# Patient Record
Sex: MALE | Race: WHITE | NOT HISPANIC OR LATINO | Employment: FULL TIME | URBAN - NONMETROPOLITAN AREA
[De-identification: names, ages, dates, MRNs, and addresses within clinical notes are randomized per-mention and may not be internally consistent; named-entity substitution may affect disease eponyms.]

---

## 2017-01-09 ENCOUNTER — OFFICE VISIT (OUTPATIENT)
Dept: ORTHOPEDIC SURGERY | Facility: CLINIC | Age: 47
End: 2017-01-09

## 2017-01-09 VITALS — BODY MASS INDEX: 36.44 KG/M2 | WEIGHT: 269 LBS | HEIGHT: 72 IN

## 2017-01-09 DIAGNOSIS — M25.531 RIGHT WRIST PAIN: ICD-10-CM

## 2017-01-09 DIAGNOSIS — R20.0 NUMBNESS AND TINGLING IN RIGHT HAND: ICD-10-CM

## 2017-01-09 DIAGNOSIS — G56.01 CARPAL TUNNEL SYNDROME ON RIGHT: Primary | ICD-10-CM

## 2017-01-09 DIAGNOSIS — R20.2 NUMBNESS AND TINGLING IN RIGHT HAND: ICD-10-CM

## 2017-01-09 PROCEDURE — 99213 OFFICE O/P EST LOW 20 MIN: CPT | Performed by: ORTHOPAEDIC SURGERY

## 2017-01-09 NOTE — PROGRESS NOTES
Venkatesh Grimes is a 46 y.o. male saw initially saw this individual he only did studies on the right side is injury was hyperextension of the ulnar aspect of his hand but he complains of carpal tunnel syndrome and he has a positive EMG but we never studied the opposite hand also was never discussed he'll want his activity would be at work when we do the surgery so he is going to do that additionally  Primary provider:  No Known Provider       Chief Complaint   Patient presents with   • Right Wrist - Follow-up, Carpal Tunnel       HISTORY OF PRESENT ILLNESS:patient was seen by PASHA Johnston on 12/16/2016 referred to Dr. Lópze for carpal tunnel syndrome, work comp has approved for carpal tunnel release.     HPI Comments: According to the patient this numbness/tingling started after an injury while at work.     Carpal Tunnel   Associated symptoms include chest pain, fatigue, neck pain, numbness and weakness.   Upper Extremity Issue   This is a new problem. The current episode started more than 1 month ago. The problem occurs daily. The problem has been gradually worsening. Associated symptoms include chest pain, fatigue, neck pain, numbness and weakness. Exacerbated by: tasks involving fine motor movements.  He has tried acetaminophen and NSAIDs for the symptoms.        CONCURRENT MEDICAL HISTORY:    Past Medical History   Diagnosis Date   • Chest pain    • CTS (carpal tunnel syndrome)    • Hyperlipidemia        Allergies   Allergen Reactions   • Iodine        No current outpatient prescriptions on file.    Past Surgical History   Procedure Laterality Date   • Rotator cuff repair         Family History   Problem Relation Age of Onset   • Diabetes Mother    • Heart disease Mother    • Hypertension Mother    • Hypertension Father    • Diabetes Father    • Hypertension Sister    • Diabetes Sister    • Heart disease Brother    • Hypertension Brother         Social History     Social History   • Marital status:   "    Spouse name: N/A   • Number of children: N/A   • Years of education: N/A     Occupational History   • Not on file.     Social History Main Topics   • Smoking status: Current Every Day Smoker     Packs/day: 1.00     Types: Cigarettes   • Smokeless tobacco: Current User     Types: Chew   • Alcohol use No   • Drug use: No   • Sexual activity: Not on file     Other Topics Concern   • Not on file     Social History Narrative        Review of Systems   Constitutional: Positive for fatigue.   Cardiovascular: Positive for chest pain.   Musculoskeletal: Positive for neck pain.   Neurological: Positive for weakness and numbness.       PHYSICAL EXAMINATION:       Visit Vitals   • Ht 72\" (182.9 cm)   • Wt 269 lb (122 kg)   • BMI 36.48 kg/m2       Physical Exam   Constitutional: He is oriented to person, place, and time. Vital signs are normal. He appears well-developed and well-nourished. He is cooperative.   HENT:   Head: Normocephalic and atraumatic.   Pulmonary/Chest: Effort normal. No respiratory distress.   Abdominal: Soft. He exhibits no distension.   Neurological: He is alert and oriented to person, place, and time. GCS eye subscore is 4. GCS verbal subscore is 5. GCS motor subscore is 6.   Skin: Skin is warm, dry and intact.   Psychiatric: He has a normal mood and affect. His speech is normal and behavior is normal. Judgment and thought content normal. Cognition and memory are normal.   Nursing note and vitals reviewed.      GAIT:     [x]  Normal  []  Antalgic    Assistive device: [x]  None  []  Walker     []  Crutches  []  Cane     []  Wheelchair  []  Stretcher    Right Hand Exam     Tenderness   The patient is experiencing no tenderness.         Range of Motion     Wrist   Extension: normal   Flexion: normal   Pronation: normal   Supination: normal     Muscle Strength   : 4/5     Tests   Tinel’s Sign (Medial Nerve): positive    Other   Erythema: absent  Scars: absent  Sensation: normal  Pulse: " present      Left Hand Exam   Left hand exam is normal.              EMG- 11/28/16  Assessment: Abnormal test. EMG-NCS test is most consistent with right upper extremity moderate-stage carpal tunnel syndrome with involvement of right median motor and sensory fibers at the level of the wrist. No obvious neurophysiologic evidence of right upper extremity ulnar neurophathy.       ASSESSMENT:    Diagnoses and all orders for this visit:    Carpal tunnel syndrome on right    Numbness and tingling in right hand    Right wrist pain          PLAN plan is carpal tunnel surgery there is no evidence of ulnar nerve neuropathy testing this in the office at the hospital patient's choice.    He is going to go to his work activity to see what light duty they have available for him after surgery and EMG of the opposite hand and sure he doesn't have bilaterally. 01/09/17 at 10:09 AM by MD Babak Guo MD

## 2017-01-09 NOTE — MR AVS SNAPSHOT
"                        Venkatesh Grimes   1/9/2017 8:00 AM   Office Visit    Dept Phone:  [phone]   Encounter #:  [id number]    Provider:  Babak López MD   Department:  [facility] ORTHOPEDICS                Your Full Care Plan              Your Updated Medication List      Notice  As of 1/9/2017  8:45 AM    You have not been prescribed any medications.            You Were Diagnosed With        Codes Comments    Carpal tunnel syndrome on right    -  Primary ICD-10-CM: G56.01  ICD-9-CM: 354.0     Numbness and tingling in right hand     ICD-10-CM: R20.2  ICD-9-CM: 782.0     Right wrist pain     ICD-10-CM: M25.531  ICD-9-CM: 719.43       Instructions     None    Patient Instructions History      Upcoming Appointments     Visit Type Date Time Department    FOLLOW UP 1/9/2017  8:00 AM [facility]    OFFICE VISIT 6/20/2017  4:15 PM [facility] Signup     Our records indicate that you have declined [facility] sign[facility]. If you would like to sign up for [facility], please email [email or web address] or call [phone] to obtain an activation code.             Other Info from Your Visit           Your Appointments     Jun 20, 2017  4:15 PM CDT   Office Visit with River Lee MD   [facility] CARDIOLOGY (--)    [address] [address] [address]  [facility] [id number] [phone]           Arrive 15 minutes prior to appointment.              Allergies     Iodine        Reason for Visit     Right Wrist - Follow-up, Carpal Tunnel           Vital Signs     Height Weight Body Mass Index Smoking Status          72\" (182.9 cm) 269 lb (122 kg) 36.48 kg/m2 Current Every Day Smoker        Problems and Diagnoses Noted     Carpal tunnel syndrome on right    Numbness and tingling in right hand    Right wrist pain        "

## 2017-03-15 ENCOUNTER — OFFICE VISIT (OUTPATIENT)
Dept: ORTHOPEDIC SURGERY | Facility: CLINIC | Age: 47
End: 2017-03-15

## 2017-03-15 VITALS — WEIGHT: 270 LBS | HEIGHT: 72 IN | BODY MASS INDEX: 36.57 KG/M2

## 2017-03-15 DIAGNOSIS — R20.0 NUMBNESS AND TINGLING IN RIGHT HAND: ICD-10-CM

## 2017-03-15 DIAGNOSIS — M25.531 RIGHT WRIST PAIN: ICD-10-CM

## 2017-03-15 DIAGNOSIS — G56.01 CARPAL TUNNEL SYNDROME ON RIGHT: Primary | ICD-10-CM

## 2017-03-15 DIAGNOSIS — R20.2 NUMBNESS AND TINGLING IN RIGHT HAND: ICD-10-CM

## 2017-03-15 PROCEDURE — 99213 OFFICE O/P EST LOW 20 MIN: CPT | Performed by: ORTHOPAEDIC SURGERY

## 2017-03-15 NOTE — PROGRESS NOTES
"  Venkatesh Grimes is a 46 y.o. male returns for follow-up carpal tunnels EMG results he has bilateral carpal tunnel syndromes left right he was going ahead and proceed with the right side since she states his work-related massive specifically if they excepted this is work-related he says he had ice ask if he's had his paperwork spelled out yes he was going and proceed with carpal tunnel release she says she's been approved we can't do that today check and plan do that next Monday change from previous examination     Chief Complaint   Patient presents with   • Right Wrist - Carpal Tunnel       HISTORY OF PRESENT ILLNESS: patient had emg done on left side by Dr. Croft 2/16/2017 (left side not work related).        CONCURRENT MEDICAL HISTORY:    Past Medical History   Diagnosis Date   • Chest pain    • CTS (carpal tunnel syndrome)    • Hyperlipidemia        Allergies   Allergen Reactions   • Iodine          Current Outpatient Prescriptions:   •  oseltamivir (TAMIFLU) 75 MG capsule, Take 1 capsule by mouth 2 (Two) Times a Day., Disp: 10 capsule, Rfl: 0    Past Surgical History   Procedure Laterality Date   • Rotator cuff repair         ROS  No fevers or chills.  No chest pain or shortness of air.  No GI or  disturbances.    PHYSICAL EXAMINATION:       Visit Vitals   • Ht 72\" (182.9 cm)   • Wt 270 lb (122 kg)   • BMI 36.62 kg/m2       Physical Exam    GAIT:     [x]  Normal  []  Antalgic    Assistive device: [x]  None  []  Walker     []  Crutches  []  Cane     []  Wheelchair  []  Stretcher    Ortho Exam no change from previous examination positive Tinel's at the wrist he states his main complaint is pain on extension of his ring and small finger they did not do studies on the ulnar nerve disease he says his whole hand goes numb I'm not sure were going to get testing for the ulnar nerve so and there is no clinical evidence of neuropathy for the ulnar nerve we just released carpal tunnel see how he does be scheduled " for next Monday  No results found.          ASSESSMENT:    Diagnoses and all orders for this visit:    Carpal tunnel syndrome on right    Numbness and tingling in right hand    Right wrist pain          PLAN    Return in about 7 days (around 3/22/2017).    Babak López MD

## 2017-03-20 ENCOUNTER — PROCEDURE VISIT (OUTPATIENT)
Dept: ORTHOPEDIC SURGERY | Facility: CLINIC | Age: 47
End: 2017-03-20

## 2017-03-20 VITALS — HEIGHT: 72 IN | BODY MASS INDEX: 36.57 KG/M2 | WEIGHT: 270 LBS

## 2017-03-20 DIAGNOSIS — R20.2 NUMBNESS AND TINGLING IN RIGHT HAND: ICD-10-CM

## 2017-03-20 DIAGNOSIS — M25.531 RIGHT WRIST PAIN: ICD-10-CM

## 2017-03-20 DIAGNOSIS — G56.01 CARPAL TUNNEL SYNDROME ON RIGHT: Primary | ICD-10-CM

## 2017-03-20 DIAGNOSIS — R20.0 NUMBNESS AND TINGLING IN RIGHT HAND: ICD-10-CM

## 2017-03-20 PROCEDURE — 64721 CARPAL TUNNEL SURGERY: CPT | Performed by: ORTHOPAEDIC SURGERY

## 2017-03-20 RX ORDER — HYDROCODONE BITARTRATE AND ACETAMINOPHEN 5; 325 MG/1; MG/1
1 TABLET ORAL EVERY 6 HOURS PRN
Qty: 30 TABLET | Refills: 0 | Status: SHIPPED | OUTPATIENT
Start: 2017-03-20 | End: 2017-04-26

## 2017-03-20 NOTE — PROGRESS NOTES
"Venkatesh Grimes is a 46 y.o. male returns for     Chief Complaint   Patient presents with   • Right Wrist - Follow-up   • Procedure     right side carpal tunnel release       HISTORY OF PRESENT ILLNESS:       CONCURRENT MEDICAL HISTORY:    Past Medical History   Diagnosis Date   • Chest pain    • CTS (carpal tunnel syndrome)    • Hyperlipidemia        Allergies   Allergen Reactions   • Iodine          Current Outpatient Prescriptions:   •  HYDROcodone-acetaminophen (NORCO) 5-325 MG per tablet, Take 1 tablet by mouth Every 6 (Six) Hours As Needed for Mild Pain (1-3)., Disp: 30 tablet, Rfl: 0  •  oseltamivir (TAMIFLU) 75 MG capsule, Take 1 capsule by mouth 2 (Two) Times a Day., Disp: 10 capsule, Rfl: 0    Past Surgical History   Procedure Laterality Date   • Rotator cuff repair         ROS  No fevers or chills.  No chest pain or shortness of air.  No GI or  disturbances.    PHYSICAL EXAMINATION:       Visit Vitals   • Ht 72\" (182.9 cm)   • Wt 270 lb (122 kg)   • BMI 36.62 kg/m2       Physical Exam    GAIT:     []  Normal  []  Antalgic    Assistive device: []  None  []  Walker     []  Crutches  []  Cane     []  Wheelchair  []  Stretcher    Ortho Exam    The patient voiced understanding of the risks, benefits, and alternative forms of treatment that were discussed and the patient consents to proceed with surgery.  All risks, benefits and alternatives were discussed.  Risks including to but not exclusive to anesthetic complications, including death, MI, CVA, infection, bleeding DVT, fracture, residual pain and need for future surgery.  Description of procedure after satisfactory application the tourniquet on the upper arm and prepping and draping of the arm.  Local for transition the area of the carpal tunnel was done with 10 cc 2% Carbocaine.  Incision was made in the distal flexor crease curvilinear into the wrist crease skin subjacent tissue was divided palmaris longus tendon was identified and protected " transverse carpal ligament and distal forearm fascia was identified.  Subcutaneous tissue was dissected off the transverse carpal ligament in the palm.  An incision was made in the transverse carpal ligament and hemostat was placed underneath the ligament  any adhesions of the median nerve from the transverse carpal ligament and the transverse carpal ligament was divided in its entirety was palpated to ensure that it was completely released sensation was made in the distal forearm fascia was scissors to release this area there is no bleeding Penrose drain was placed in depths of the wound and the wound was closed with running 4-0 nylon skin suture sterile dressing volar plaster splint and Ace wrap patient was instructed in wound care and activities remove dressing in 24 hours removal of the drain follow-up in the office and in 7-10 days for suture removal.            ASSESSMENT:    Diagnoses and all orders for this visit:    Carpal tunnel syndrome on right    Right wrist pain    Numbness and tingling in right hand    Other orders  -     HYDROcodone-acetaminophen (NORCO) 5-325 MG per tablet; Take 1 tablet by mouth Every 6 (Six) Hours As Needed for Mild Pain (1-3).          PLAN    Return in about 10 days (around 3/30/2017) for suture removal. .    Babak López MD

## 2017-03-29 ENCOUNTER — OFFICE VISIT (OUTPATIENT)
Dept: ORTHOPEDIC SURGERY | Facility: CLINIC | Age: 47
End: 2017-03-29

## 2017-03-29 VITALS — HEIGHT: 72 IN | WEIGHT: 270 LBS | BODY MASS INDEX: 36.57 KG/M2

## 2017-03-29 DIAGNOSIS — G56.01 CARPAL TUNNEL SYNDROME ON RIGHT: Primary | ICD-10-CM

## 2017-03-29 DIAGNOSIS — R20.2 NUMBNESS AND TINGLING IN RIGHT HAND: ICD-10-CM

## 2017-03-29 DIAGNOSIS — R20.0 NUMBNESS AND TINGLING IN RIGHT HAND: ICD-10-CM

## 2017-03-29 DIAGNOSIS — M25.531 RIGHT WRIST PAIN: ICD-10-CM

## 2017-03-29 PROCEDURE — 99024 POSTOP FOLLOW-UP VISIT: CPT | Performed by: ORTHOPAEDIC SURGERY

## 2017-03-29 NOTE — PROGRESS NOTES
"Postop Follow-up    Name:  Venkatesh Grimes  Date:  3/29/2017  :  1970    Chief Complaint:    Chief Complaint   Patient presents with   • Post-op     right site carpal tunnel release   • Wound Check   • Suture / Staple Removal     Date of surgery: 3/20/17    History of Present Illness: patient is having some pain in the right palm        Current Outpatient Prescriptions:   •  HYDROcodone-acetaminophen (NORCO) 5-325 MG per tablet, Take 1 tablet by mouth Every 6 (Six) Hours As Needed for Mild Pain (1-3)., Disp: 30 tablet, Rfl: 0  •  oseltamivir (TAMIFLU) 75 MG capsule, Take 1 capsule by mouth 2 (Two) Times a Day., Disp: 10 capsule, Rfl: 0    Allergies   Allergen Reactions   • Iodine          Exam:  Vitals:    17 0919   Weight: 270 lb (122 kg)   Height: 72\" (182.9 cm)       The patient is awake, alert, and oriented and in no apparent distress.    Right upper extremity: Wounds healed no infection full range of motion of the hand and fingers    Left upper extremity:    Right lower extremity:    Left lower extremity:      No results found.      Assessment:  Diagnoses and all orders for this visit:    Carpal tunnel syndrome on right    Right wrist pain    Numbness and tingling in right hand          Plan: Return to work modified duty for 1 month recheck in 1 month        No Follow-up on file.      17 at 9:20 AM by Babak López MD  "

## 2017-04-26 ENCOUNTER — OFFICE VISIT (OUTPATIENT)
Dept: ORTHOPEDIC SURGERY | Facility: CLINIC | Age: 47
End: 2017-04-26

## 2017-04-26 VITALS — HEIGHT: 72 IN | BODY MASS INDEX: 36.3 KG/M2 | WEIGHT: 268 LBS

## 2017-04-26 DIAGNOSIS — G56.01 CARPAL TUNNEL SYNDROME ON RIGHT: Primary | ICD-10-CM

## 2017-04-26 DIAGNOSIS — R20.2 NUMBNESS AND TINGLING IN RIGHT HAND: ICD-10-CM

## 2017-04-26 DIAGNOSIS — M25.531 RIGHT WRIST PAIN: ICD-10-CM

## 2017-04-26 DIAGNOSIS — R20.0 NUMBNESS AND TINGLING IN RIGHT HAND: ICD-10-CM

## 2017-04-26 PROCEDURE — 99024 POSTOP FOLLOW-UP VISIT: CPT | Performed by: ORTHOPAEDIC SURGERY

## 2017-04-26 NOTE — PROGRESS NOTES
"Venkatesh Grimes is a 46 y.o. male returns for     Chief Complaint   Patient presents with   • Right Hand - Follow-up     Follow up post Carpal tunnel release, 3/20/17       HISTORY OF PRESENT ILLNESS:  Soreness but overall doing well.       CONCURRENT MEDICAL HISTORY:    Past Medical History:   Diagnosis Date   • Chest pain    • CTS (carpal tunnel syndrome)    • Hyperlipidemia        Allergies   Allergen Reactions   • Iodine        No current outpatient prescriptions on file.    Past Surgical History:   Procedure Laterality Date   • ROTATOR CUFF REPAIR         ROS  No fevers or chills.  No chest pain or shortness of air.  No GI or  disturbances.    PHYSICAL EXAMINATION:       Ht 72\" (182.9 cm)  Wt 268 lb (122 kg)  BMI 36.35 kg/m2    Physical Exam    GAIT:     [x]  Normal  []  Antalgic    Assistive device: [x]  None  []  Walker     []  Crutches  []  Cane     []  Wheelchair  []  Stretcher    Ortho Exam wounds healed no infection still some tenderness fall function he's return to full work activity no residuals      No results found.          ASSESSMENT:    Diagnoses and all orders for this visit:    Carpal tunnel syndrome on right    Right wrist pain    Numbness and tingling in right hand          PLAN    No Follow-up on file.    Babak López MD  "

## 2017-08-01 ENCOUNTER — APPOINTMENT (OUTPATIENT)
Dept: LAB | Facility: HOSPITAL | Age: 47
End: 2017-08-01

## 2017-08-01 ENCOUNTER — OFFICE VISIT (OUTPATIENT)
Dept: FAMILY MEDICINE CLINIC | Facility: CLINIC | Age: 47
End: 2017-08-01

## 2017-08-01 VITALS
BODY MASS INDEX: 36.08 KG/M2 | SYSTOLIC BLOOD PRESSURE: 128 MMHG | WEIGHT: 266.4 LBS | HEIGHT: 72 IN | DIASTOLIC BLOOD PRESSURE: 88 MMHG

## 2017-08-01 DIAGNOSIS — R13.12 OROPHARYNGEAL DYSPHAGIA: Primary | ICD-10-CM

## 2017-08-01 DIAGNOSIS — Z13.220 SCREENING CHOLESTEROL LEVEL: ICD-10-CM

## 2017-08-01 DIAGNOSIS — Z72.0 TOBACCO USE: ICD-10-CM

## 2017-08-01 DIAGNOSIS — R53.83 FATIGUE, UNSPECIFIED TYPE: ICD-10-CM

## 2017-08-01 PROBLEM — M25.531 RIGHT WRIST PAIN: Status: RESOLVED | Noted: 2017-01-09 | Resolved: 2017-08-01

## 2017-08-01 PROBLEM — G56.01 CARPAL TUNNEL SYNDROME ON RIGHT: Chronic | Status: ACTIVE | Noted: 2017-01-09

## 2017-08-01 PROBLEM — R20.0 NUMBNESS AND TINGLING IN RIGHT HAND: Status: RESOLVED | Noted: 2017-01-09 | Resolved: 2017-08-01

## 2017-08-01 PROBLEM — R20.2 NUMBNESS AND TINGLING IN RIGHT HAND: Status: RESOLVED | Noted: 2017-01-09 | Resolved: 2017-08-01

## 2017-08-01 LAB
ALBUMIN SERPL-MCNC: 5.1 G/DL (ref 3.4–4.8)
ALBUMIN/GLOB SERPL: 1.8 G/DL (ref 1.1–1.8)
ALP SERPL-CCNC: 77 U/L (ref 38–126)
ALT SERPL W P-5'-P-CCNC: 44 U/L (ref 21–72)
ANION GAP SERPL CALCULATED.3IONS-SCNC: 12 MMOL/L (ref 5–15)
AST SERPL-CCNC: 56 U/L (ref 17–59)
BILIRUB SERPL-MCNC: 0.6 MG/DL (ref 0.2–1.3)
BUN BLD-MCNC: 17 MG/DL (ref 7–21)
BUN/CREAT SERPL: 17.2 (ref 7–25)
CALCIUM SPEC-SCNC: 10.1 MG/DL (ref 8.4–10.2)
CHLORIDE SERPL-SCNC: 100 MMOL/L (ref 95–110)
CHOLEST SERPL-MCNC: 227 MG/DL (ref 0–199)
CO2 SERPL-SCNC: 26 MMOL/L (ref 22–31)
CREAT BLD-MCNC: 0.99 MG/DL (ref 0.7–1.3)
DEPRECATED RDW RBC AUTO: 40 FL (ref 35.1–43.9)
ERYTHROCYTE [DISTWIDTH] IN BLOOD BY AUTOMATED COUNT: 12.4 % (ref 11.5–14.5)
GFR SERPL CREATININE-BSD FRML MDRD: 82 ML/MIN/1.73 (ref 63–147)
GLOBULIN UR ELPH-MCNC: 2.8 GM/DL (ref 2.3–3.5)
GLUCOSE BLD-MCNC: 86 MG/DL (ref 60–100)
HBA1C MFR BLD: 5.4 % (ref 4–5.6)
HCT VFR BLD AUTO: 45.5 % (ref 39–49)
HDLC SERPL-MCNC: 58 MG/DL (ref 60–200)
HGB BLD-MCNC: 15.7 G/DL (ref 13.7–17.3)
IRON 24H UR-MRATE: 71 MCG/DL (ref 49–181)
LDLC SERPL CALC-MCNC: 138 MG/DL (ref 0–129)
LDLC/HDLC SERPL: 2.38 {RATIO} (ref 0–3.55)
MCH RBC QN AUTO: 30.8 PG (ref 26.5–34)
MCHC RBC AUTO-ENTMCNC: 34.5 G/DL (ref 31.5–36.3)
MCV RBC AUTO: 89.2 FL (ref 80–98)
PLATELET # BLD AUTO: 318 10*3/MM3 (ref 150–450)
PMV BLD AUTO: 10.8 FL (ref 8–12)
POTASSIUM BLD-SCNC: 3.9 MMOL/L (ref 3.5–5.1)
PROT SERPL-MCNC: 7.9 G/DL (ref 6.3–8.6)
RBC # BLD AUTO: 5.1 10*6/MM3 (ref 4.37–5.74)
SODIUM BLD-SCNC: 138 MMOL/L (ref 137–145)
T4 FREE SERPL-MCNC: 1.09 NG/DL (ref 0.78–2.19)
TRIGL SERPL-MCNC: 155 MG/DL (ref 20–199)
TSH SERPL DL<=0.05 MIU/L-ACNC: 3.48 MIU/ML (ref 0.46–4.68)
VIT B12 BLD-MCNC: 517 PG/ML (ref 239–931)
VLDLC SERPL-MCNC: 31 MG/DL
WBC NRBC COR # BLD: 6.97 10*3/MM3 (ref 3.2–9.8)

## 2017-08-01 PROCEDURE — 82607 VITAMIN B-12: CPT | Performed by: FAMILY MEDICINE

## 2017-08-01 PROCEDURE — 84439 ASSAY OF FREE THYROXINE: CPT | Performed by: FAMILY MEDICINE

## 2017-08-01 PROCEDURE — 85027 COMPLETE CBC AUTOMATED: CPT | Performed by: FAMILY MEDICINE

## 2017-08-01 PROCEDURE — 84443 ASSAY THYROID STIM HORMONE: CPT | Performed by: FAMILY MEDICINE

## 2017-08-01 PROCEDURE — 80053 COMPREHEN METABOLIC PANEL: CPT | Performed by: FAMILY MEDICINE

## 2017-08-01 PROCEDURE — 80061 LIPID PANEL: CPT | Performed by: FAMILY MEDICINE

## 2017-08-01 PROCEDURE — 99214 OFFICE O/P EST MOD 30 MIN: CPT | Performed by: FAMILY MEDICINE

## 2017-08-01 PROCEDURE — 83036 HEMOGLOBIN GLYCOSYLATED A1C: CPT | Performed by: FAMILY MEDICINE

## 2017-08-01 PROCEDURE — 83540 ASSAY OF IRON: CPT | Performed by: FAMILY MEDICINE

## 2017-08-01 PROCEDURE — 36415 COLL VENOUS BLD VENIPUNCTURE: CPT | Performed by: FAMILY MEDICINE

## 2017-08-04 ENCOUNTER — HOSPITAL ENCOUNTER (OUTPATIENT)
Dept: GENERAL RADIOLOGY | Facility: HOSPITAL | Age: 47
Discharge: HOME | End: 2017-08-04
Admitting: FAMILY MEDICINE

## 2017-08-04 DIAGNOSIS — R13.12 OROPHARYNGEAL DYSPHAGIA: ICD-10-CM

## 2017-08-04 DIAGNOSIS — Z72.0 TOBACCO USE: ICD-10-CM

## 2017-08-04 PROCEDURE — 74220 X-RAY XM ESOPHAGUS 1CNTRST: CPT

## 2017-08-04 RX ADMIN — BARIUM SULFATE 120 ML: 960 POWDER, FOR SUSPENSION ORAL at 08:35

## 2017-08-18 ENCOUNTER — OFFICE VISIT (OUTPATIENT)
Dept: OTOLARYNGOLOGY | Facility: CLINIC | Age: 47
End: 2017-08-18

## 2017-08-18 VITALS — WEIGHT: 271 LBS | HEIGHT: 72 IN | TEMPERATURE: 97.5 F | BODY MASS INDEX: 36.7 KG/M2

## 2017-08-18 DIAGNOSIS — K21.9 LPRD (LARYNGOPHARYNGEAL REFLUX DISEASE): Primary | ICD-10-CM

## 2017-08-18 DIAGNOSIS — R13.12 OROPHARYNGEAL DYSPHAGIA: ICD-10-CM

## 2017-08-18 DIAGNOSIS — G47.33 OBSTRUCTIVE SLEEP APNEA SYNDROME: ICD-10-CM

## 2017-08-18 PROCEDURE — 99204 OFFICE O/P NEW MOD 45 MIN: CPT | Performed by: OTOLARYNGOLOGY

## 2017-08-18 PROCEDURE — 31575 DIAGNOSTIC LARYNGOSCOPY: CPT | Performed by: OTOLARYNGOLOGY

## 2017-08-18 RX ORDER — RANITIDINE 300 MG/1
300 TABLET ORAL NIGHTLY
Qty: 30 TABLET | Refills: 2 | Status: SHIPPED | OUTPATIENT
Start: 2017-08-18 | End: 2017-10-03

## 2017-08-18 RX ORDER — OMEPRAZOLE 20 MG/1
40 TABLET, DELAYED RELEASE ORAL DAILY
Qty: 168 TABLET | Refills: 4 | Status: SHIPPED | OUTPATIENT
Start: 2017-08-18 | End: 2017-10-03 | Stop reason: SINTOL

## 2017-08-18 NOTE — PROGRESS NOTES
Subjective   Venkatesh Grimes is a 46 y.o. male.   CC sore throat and dysphagia  History of Present Illness     She complains sore throat difficulty swallowing points to the lower midportion of the neck is had this for at least 2 months he has weight loss also has snoring and apnea.  He's had no fever chills has not coughed up any blood food does seem to catches it goes down but it goes down.  Is not having neck swelling or adenopathy.  He's not had such difficulty that he can't swallow causing  Weight loss.  A barium swallow which is reportedly normal.  Take some medicine reflux but doesn't take it regularly.  Has not had chronic tonsillitis does have some problems breathing through his nose is some postnasal drip not severe denies any adenopathy or neck swelling or mass difficulty breathing does have some occasional hoarseness especially in the mornings according to his wife.  His smoking use chewing tobacco    The following portions of the patient's history were reviewed and updated as appropriate: allergies, current medications, past family history, past medical history, past social history, past surgical history and problem list.      Venkatesh Grimes reports that he has been smoking Cigarettes.  He has been smoking about 1.00 pack per day. His smokeless tobacco use includes Chew. He reports that he does not drink alcohol or use illicit drugs.  Patient is a tobacco user and has been counseled for use of tobacco products    Family History   Problem Relation Age of Onset   • Diabetes Mother    • Heart disease Mother      ischemic   • Hypertension Mother    • Heart failure Mother      congestive   • Hypertension Father    • Diabetes Father    • Stroke Father    • Cancer Father    • Hypertension Sister    • Diabetes Sister    • Thyroid cancer Sister    • Heart disease Brother    • Hypertension Brother    • Leukemia Other    • Cancer Paternal Grandmother          Current Outpatient Prescriptions:   •   omeprazole OTC (PriLOSEC OTC) 20 MG EC tablet, Take 2 tablets by mouth Daily., Disp: 168 tablet, Rfl: 4  •  raNITIdine (ZANTAC) 300 MG tablet, Take 1 tablet by mouth Every Night., Disp: 30 tablet, Rfl: 2    Allergies   Allergen Reactions   • Iodine    • Shellfish-Derived Products        Past Medical History:   Diagnosis Date   • CTS (carpal tunnel syndrome)    • Hyperlipidemia    • Other obesity    • Tobacco dependence syndrome          Review of Systems   Constitutional: Positive for fatigue. Negative for fever.   HENT: Positive for congestion, trouble swallowing and voice change.    Respiratory: Positive for cough. Negative for choking.    Musculoskeletal: Positive for back pain.   Hematological: Negative.    All other systems reviewed and are negative.          Objective   Physical Exam   Constitutional: He is oriented to person, place, and time. He appears well-developed and well-nourished.   HENT:   Head: Normocephalic and atraumatic.   Right Ear: Hearing, tympanic membrane, external ear and ear canal normal.   Left Ear: Hearing, tympanic membrane, external ear and ear canal normal.   Nose: Mucosal edema and septal deviation present. No rhinorrhea or nasal deformity. No epistaxis. Right sinus exhibits no maxillary sinus tenderness and no frontal sinus tenderness. Left sinus exhibits no maxillary sinus tenderness and no frontal sinus tenderness.   Mouth/Throat: Uvula is midline, oropharynx is clear and moist and mucous membranes are normal. No trismus in the jaw. Normal dentition. No oropharyngeal exudate or posterior oropharyngeal edema. Tonsils are 0 on the right. Tonsils are 0 on the left. No tonsillar exudate.       Eyes: Conjunctivae are normal.   Neck: Normal range of motion. Neck supple. No JVD present. No tracheal deviation present. No thyromegaly present.   Cardiovascular: Normal rate and regular rhythm.    Pulmonary/Chest: Effort normal and breath sounds normal.   Musculoskeletal: Normal range of  motion.   Lymphadenopathy:        Head (right side): No submental, no submandibular, no tonsillar, no preauricular, no posterior auricular and no occipital adenopathy present.        Head (left side): No submental, no submandibular, no tonsillar, no preauricular, no posterior auricular and no occipital adenopathy present.     He has no cervical adenopathy.        Right cervical: No superficial cervical, no deep cervical and no posterior cervical adenopathy present.       Left cervical: No superficial cervical, no deep cervical and no posterior cervical adenopathy present.   Neurological: He is alert and oriented to person, place, and time. No cranial nerve deficit.   Skin: Skin is warm.   Psychiatric: He has a normal mood and affect. His speech is normal and behavior is normal. Thought content normal.   Nursing note and vitals reviewed.  I reviewed the barium swallow with he and his wife    PROCEDURE: Esophagram     REASON FOR EXAM: several month history of upper third dysphagia.   smokeless user, Z72.0 Tobacco use R13.12 Dysphagia, oropharyngeal  phase     FINDINGS: Total fluoroscopic time of 2 minutes 32 seconds. 69  images obtained. Effervescent granules and barium was  administered in the normal fashion. The esophagus appears normal  in contour, mucosal pattern, motility. No intrinsic or extrinsic  lesion. No obstructing or constricting lesion.No hiatal hernia or  gastroesophageal reflux . A 13 mm barium tablet passed without  difficulty. Evaluation of deglutition reveals normal deglutition  with no laryngeal penetration or aspiration.     IMPRESSION:  Normal esophagram.     Electronically signed by:  Simon Blanton MD  8/4/2017 9:37 AM CDT  Workstation: [id number]  Procedure Note    Pre-operative Diagnosis:   Chief Complaint   Patient presents with   • Sore Throat   • Difficulty Swallowing       Post-operative Diagnosis: same    Anesthesia: topical with xylocaine and neosynephrine    Endoscopy Type:  Flexible  Laryngoscopy    Procedure Details:    The patient was placed in the sitting position.  After topical anesthesia and decongestion, the 4 mm laryngoscope was passed.  The nasal cavities, nasopharynx, oropharynx, hypopharynx, and larynx were all examined.  Vocal cords were examined during respiration and phonation.  The following findings were noted:    Findings: Previously noted nasal findings were confirmed. Nasopharynx without mass, hypopharynx and larynx without evidence of neoplasm. Vocal cord mobility intact. There is chronic appearing edema and erythema of the laryngeal structures consistent with chronic laryngitis.    Condition:  Stable.  Patient tolerated procedure well.    Complications:  None    Assessment/Plan   Venkatesh was seen today for sore throat and difficulty swallowing.    Diagnoses and all orders for this visit:    LPRD (laryngopharyngeal reflux disease)    Oropharyngeal dysphagia    Obstructive sleep apnea syndrome  -     Ambulatory Referral to Sleep Medicine    Other orders  -     omeprazole OTC (PriLOSEC OTC) 20 MG EC tablet; Take 2 tablets by mouth Daily.  -     raNITIdine (ZANTAC) 300 MG tablet; Take 1 tablet by mouth Every Night.      I talked to him in detail about the importance of smoking cessation.  I talked to him in detail about reflux precautions.  Talked to him about proper use medications to minimize reflux and about weight loss.    Has referral to sleep clinic for possible sleep apnea because witnessed apnea by his wife.  Call for questions or problems    If no improvement in the next 3 weeks call our office

## 2017-10-03 ENCOUNTER — OFFICE VISIT (OUTPATIENT)
Dept: OTOLARYNGOLOGY | Facility: CLINIC | Age: 47
End: 2017-10-03

## 2017-10-03 VITALS — TEMPERATURE: 97.8 F | WEIGHT: 271 LBS | BODY MASS INDEX: 36.7 KG/M2 | HEIGHT: 72 IN

## 2017-10-03 DIAGNOSIS — G47.33 OBSTRUCTIVE SLEEP APNEA SYNDROME: ICD-10-CM

## 2017-10-03 DIAGNOSIS — R13.13 PHARYNGEAL DYSPHAGIA: ICD-10-CM

## 2017-10-03 DIAGNOSIS — K21.9 LPRD (LARYNGOPHARYNGEAL REFLUX DISEASE): Primary | ICD-10-CM

## 2017-10-03 PROCEDURE — 99213 OFFICE O/P EST LOW 20 MIN: CPT | Performed by: OTOLARYNGOLOGY

## 2017-10-03 PROCEDURE — 31575 DIAGNOSTIC LARYNGOSCOPY: CPT | Performed by: OTOLARYNGOLOGY

## 2017-10-03 RX ORDER — PANTOPRAZOLE SODIUM 40 MG/1
40 TABLET, DELAYED RELEASE ORAL DAILY
Qty: 30 TABLET | Refills: 2 | Status: SHIPPED | OUTPATIENT
Start: 2017-10-03 | End: 2018-01-05

## 2017-10-03 RX ORDER — CETIRIZINE HYDROCHLORIDE 10 MG/1
10 TABLET ORAL DAILY PRN
Qty: 30 TABLET | Refills: 11 | COMMUNITY
End: 2017-10-04 | Stop reason: SDUPTHER

## 2017-10-03 NOTE — PROGRESS NOTES
Subjective   Venkatesh Grimes is a 46 y.o. male.     Chief complaint: dysphagia in the lower throat and sometimes in the chest  History of Present Illness     He states he only took 2 weeks his medications that he had some itchiness between fingers out rash or other symptoms  Hediscontinued both medications did not call us he stopped the medicine both his Zantac in the time pump inhibitor.  He then started in a couple weeks later the problems began again say really only use the medications less than 4 weeks and they are not continuous.  Is not having hemoptysis not losing weight no adenopathy in the feels like there is something in his throat and irritates his throat.    The following portions of the patient's history were reviewed and updated as appropriate: allergies, current medications, past family history, past medical history, past social history, past surgical history and problem list.      Current Outpatient Prescriptions:   •  cetirizine (zyrTEC) 10 MG tablet, Take 1 tablet by mouth Daily As Needed for Allergies., Disp: 30 tablet, Rfl: 11  •  pantoprazole (PROTONIX) 40 MG EC tablet, Take 1 tablet by mouth Daily., Disp: 30 tablet, Rfl: 2    Allergies   Allergen Reactions   • Iodine    • Shellfish-Derived Products        Review of Systems   Constitutional: Negative for fever.   HENT: Positive for trouble swallowing. Negative for mouth sores and voice change.    Respiratory: Negative for cough and choking.    Hematological: Negative for adenopathy.           Objective   Physical Exam   Constitutional: He is oriented to person, place, and time. He appears well-developed and well-nourished.   HENT:   Head: Normocephalic and atraumatic.   Right Ear: Hearing, tympanic membrane, external ear and ear canal normal.   Left Ear: Hearing, tympanic membrane, external ear and ear canal normal.   Nose: Mucosal edema and septal deviation present. No rhinorrhea or nasal deformity. No epistaxis. Right sinus exhibits no  maxillary sinus tenderness and no frontal sinus tenderness. Left sinus exhibits no maxillary sinus tenderness and no frontal sinus tenderness.   Mouth/Throat: Uvula is midline, oropharynx is clear and moist and mucous membranes are normal. No trismus in the jaw. Normal dentition. No oropharyngeal exudate or posterior oropharyngeal edema. Tonsils are 0 on the right. Tonsils are 0 on the left. No tonsillar exudate.       Eyes: Conjunctivae are normal.   Neck: Normal range of motion. Neck supple. No JVD present. No tracheal deviation present. No thyromegaly present.   Cardiovascular: Normal rate and regular rhythm.    Pulmonary/Chest: Effort normal and breath sounds normal.   Musculoskeletal: Normal range of motion.   Lymphadenopathy:        Head (right side): No submental, no submandibular, no tonsillar, no preauricular, no posterior auricular and no occipital adenopathy present.        Head (left side): No submental, no submandibular, no tonsillar, no preauricular, no posterior auricular and no occipital adenopathy present.     He has no cervical adenopathy.        Right cervical: No superficial cervical, no deep cervical and no posterior cervical adenopathy present.       Left cervical: No superficial cervical, no deep cervical and no posterior cervical adenopathy present.   Neurological: He is alert and oriented to person, place, and time. No cranial nerve deficit.   Skin: Skin is warm.   Psychiatric: He has a normal mood and affect. His speech is normal and behavior is normal. Thought content normal.   Nursing note and vitals reviewed.        Procedure Note    Pre-operative Diagnosis:   Chief Complaint   Patient presents with   • Follow-up       Post-operative Diagnosis: same    Anesthesia: topical with xylocaine and neosynephrine    Endoscopy Type:  Flexible Laryngoscopy    Procedure Details:    The patient was placed in the sitting position.  After topical anesthesia and decongestion, the 4 mm laryngoscope was  passed.  The nasal cavities, nasopharynx, oropharynx, hypopharynx, and larynx were all examined.  Vocal cords were examined during respiration and phonation.  The following findings were noted:    Findings: mInterarytenoid swollen mild swelling in the posterior larynx and esophageal introitus no other mass or purulence    Condition:  Stable.  Patient tolerated procedure well.    Complications:  None  Assessment/Plan   Venkatesh was seen today for follow-up.    Diagnoses and all orders for this visit:    LPRD (laryngopharyngeal reflux disease)    Obstructive sleep apnea syndrome    Pharyngeal dysphagia    Other orders  -     pantoprazole (PROTONIX) 40 MG EC tablet; Take 1 tablet by mouth Daily.    78 study evaluation tomorrow.  As to his swallowing and try totally different medication use an antihistamine.  He had no anaphylactic type reactions to his other medication it's unclear which one was a problem.  Will use for tonics a different drug was Zyrtec.  If he has symptoms is to call us immediately.  If he any trouble swallowing and breathing he would go the emergency room immediately.  Told him he may have to see a GI specialist will try conservative pressure more time with use of the antihistamine.  His main reaction which is some itching between his fingers dryness of skin which she attributes to the medication it's an unusual reaction if it is related to the medication.

## 2017-10-04 ENCOUNTER — CONSULT (OUTPATIENT)
Dept: SLEEP MEDICINE | Facility: HOSPITAL | Age: 47
End: 2017-10-04

## 2017-10-04 VITALS
OXYGEN SATURATION: 98 % | HEIGHT: 72 IN | BODY MASS INDEX: 37.11 KG/M2 | WEIGHT: 274 LBS | SYSTOLIC BLOOD PRESSURE: 126 MMHG | DIASTOLIC BLOOD PRESSURE: 66 MMHG | HEART RATE: 82 BPM

## 2017-10-04 DIAGNOSIS — G47.33 OBSTRUCTIVE SLEEP APNEA, ADULT: Primary | ICD-10-CM

## 2017-10-04 PROCEDURE — 99406 BEHAV CHNG SMOKING 3-10 MIN: CPT | Performed by: INTERNAL MEDICINE

## 2017-10-04 PROCEDURE — 99203 OFFICE O/P NEW LOW 30 MIN: CPT | Performed by: INTERNAL MEDICINE

## 2017-10-04 RX ORDER — CETIRIZINE HYDROCHLORIDE 10 MG/1
10 TABLET ORAL DAILY PRN
Qty: 30 TABLET | Refills: 11 | Status: SHIPPED | OUTPATIENT
Start: 2017-10-04 | End: 2017-10-30

## 2017-10-04 NOTE — PROGRESS NOTES
New Patient Sleep Medicine Consultation    Encounter Date: 10/4/2017         Patient's PCP: Woo Lamas MD  Referring provider: Nikolas Anne MD  Reason for consultation: loud disruptive snoring, awakening gasping for breath, witnessed apneas and excessive daytime sleepiness    Venkatesh Grimes is a 46 y.o. male who presents with above complaints for many years. He  admits to daytime fatigue, and non-restorative sleep. His bedtime is ~ 4643-9565. He  falls asleep after 10-20 minutes, and is up 0-1 times per night. He wakes up ~ 0530. He drinks 12 cups of coffee, 0 teas, and 1-2 sodas per day. He drinks 2-4 alcoholic beverages per week. He does not smoke since last year but chews smokeless tobacco. He has rare somniloquy. He denies abnormal dreams, cataplexy, sleep paralysis, or hypnagogic hallucinations. He does not take sedatives or hypnotics. He has no sleepiness with driving. He naps at times while watching tv.    Previous tonsillectomy and adenoidectomy with septal deviation surgery - secondary to frequent sore throat    RLS sx: no, but wife does    [place] - 3    Past Medical History:   Diagnosis Date   • CTS (carpal tunnel syndrome)    • Hyperlipidemia    • Other obesity    • Tobacco dependence syndrome      Social History     Social History   • Marital status:      Spouse name: N/A   • Number of children: N/A   • Years of education: N/A     Occupational History   • Not on file.     Social History Main Topics   • Smoking status: Former Smoker     Packs/day: 1.00     Types: Cigarettes   • Smokeless tobacco: Current User     Types: Chew   • Alcohol use No   • Drug use: No   • Sexual activity: Yes     Other Topics Concern   • Not on file     Social History Narrative     Family History   Problem Relation Age of Onset   • Diabetes Mother    • Heart disease Mother      ischemic   • Hypertension Mother    • Heart failure Mother      congestive   • Hypertension Father    • Diabetes Father    •  "Stroke Father    • Cancer Father    • Hypertension Sister    • Diabetes Sister    • Thyroid cancer Sister    • Heart disease Brother    • Hypertension Brother    • Leukemia Other    • Cancer Paternal Grandmother    , 2 children  4 brothers, 5 sisters - FH (+) - DM, HTN, stroke, Cancer and heart disease  Smoking history: smoked 1 ppds from age 17 until 46  FH of sleep disorders: none diagnosed    Review of Systems:  has issues with swallowing that are being investigated by otolaryngology Patient advised to discuss any positive ROS with PCP.      Vitals:    10/04/17 1612   BP: 126/66   Pulse: 82   SpO2: 98%     Body mass index is 37.16 kg/(m^2).    Neck circumference: 18.75\"            General: Alert. Cooperative. Well developed. No acute distress.             Head:  Normocephalic. Symmetrical. Atraumatic.              Eyes: Sclera clear. No icterus. PERRLA. Normal EOM.             Ears: No deformities. Normal hearing.             Nose: No septal deviation. No drainage.          Throat: No oral lesions. No thrush. Moist mucous membranes.    Tongue is normal    Dentition is fair with previous dental work, high arched palate       Pharynx: Posterior pharyngeal pillars are wide    Mallampati score of IV (only hard palate visible)    Pharynx is nonerythematous   Chest Wall:  Normal shape. Symmetric expansion with respiration. No tenderness.             Neck:  Trachea midline.           Lungs:  Clear to auscultation bilaterally. No wheezes. No rhonchi. No rales. Respirations regular, even and unlabored.            Heart:  Regular rhythm and normal rate. Normal S1 and S2. No murmur.     Abdomen:  Soft, non-tender and non-distended. Normal bowel sounds. No masses.  Extremities:  Moves all extremities well. No edema.           Pulses: Pulses palpable and equal bilaterally.               Skin: Dry. Intact. No bleeding. No rash.           Neuro: Moves all 4 extremities and cranial nerves grossly " "intact.  Psychiatric: Normal mood and affect.      Current Outpatient Prescriptions:   •  cetirizine (zyrTEC) 10 MG tablet, Take 1 tablet by mouth Daily As Needed for Allergies., Disp: 30 tablet, Rfl: 11  •  pantoprazole (PROTONIX) 40 MG EC tablet, Take 1 tablet by mouth Daily., Disp: 30 tablet, Rfl: 2    ASSESSMENT:  1. Obstructive sleep apnea presumed - check HST  2. Dysphagia - being worked up by ENT  3. Mild claustraphobia  4. Caffeine excess - recommend reducing caffeine intake over time to no more than (3) caffeine beverages per day, all before 4pm.  5. Nicotine dependence without complication- given [employer]\"s \"Thinking of quitting smoking\" flyer and referred patient to call 8-800-QUIT-NOW. Smoking and tobacco use cessation counseling visit was 5 minutes       This document has been electronically signed by Anders Selby MD on October 4, 2017         CC: MD Omid Alicea Robert A, MD  "

## 2017-10-19 ENCOUNTER — HOSPITAL ENCOUNTER (OUTPATIENT)
Dept: SLEEP MEDICINE | Facility: HOSPITAL | Age: 47
Discharge: HOME | End: 2017-10-19
Attending: INTERNAL MEDICINE | Admitting: INTERNAL MEDICINE

## 2017-10-19 DIAGNOSIS — G47.33 OBSTRUCTIVE SLEEP APNEA, ADULT: ICD-10-CM

## 2017-10-19 PROCEDURE — 95806 SLEEP STUDY UNATT&RESP EFFT: CPT | Performed by: INTERNAL MEDICINE

## 2017-10-19 PROCEDURE — 95806 SLEEP STUDY UNATT&RESP EFFT: CPT

## 2017-10-30 ENCOUNTER — OFFICE VISIT (OUTPATIENT)
Dept: OTOLARYNGOLOGY | Facility: CLINIC | Age: 47
End: 2017-10-30

## 2017-10-30 VITALS — WEIGHT: 274 LBS | TEMPERATURE: 97 F | HEIGHT: 72 IN | BODY MASS INDEX: 37.11 KG/M2

## 2017-10-30 DIAGNOSIS — G47.33 OBSTRUCTIVE SLEEP APNEA SYNDROME: ICD-10-CM

## 2017-10-30 DIAGNOSIS — R13.13 PHARYNGEAL DYSPHAGIA: ICD-10-CM

## 2017-10-30 DIAGNOSIS — K21.9 LPRD (LARYNGOPHARYNGEAL REFLUX DISEASE): Primary | ICD-10-CM

## 2017-10-30 PROCEDURE — 99213 OFFICE O/P EST LOW 20 MIN: CPT | Performed by: OTOLARYNGOLOGY

## 2017-10-30 RX ORDER — FEXOFENADINE HCL 180 MG/1
180 TABLET ORAL DAILY
COMMUNITY
End: 2018-01-05

## 2017-10-30 NOTE — PROGRESS NOTES
Subjective   Venkatesh Grimes is a 47 y.o. male.   CC: f/u LPR and globus    History of Present Illness     Examination states his pain is throat is improved.  Having sensation in with pills that it's hard to get things down and points down his chest.  He's had his home sleep study and has significant apnea findings but has not followed up with Dr. Selby.  He is NOT losing weight, coughing up any blood, or having any major voice changes or neck masses.        The following portions of the patient's history were reviewed and updated as appropriate: allergies, current medications, past family history, past medical history, past social history, past surgical history and problem list.      Current Outpatient Prescriptions:   •  fexofenadine (ALLEGRA) 180 MG tablet, Take 180 mg by mouth Daily., Disp: , Rfl:   •  pantoprazole (PROTONIX) 40 MG EC tablet, Take 1 tablet by mouth Daily., Disp: 30 tablet, Rfl: 2    Allergies   Allergen Reactions   • Iodine    • Shellfish-Derived Products           Review of Systems   Constitutional: Negative for fever and unexpected weight change.   HENT: Positive for trouble swallowing. Negative for sore throat and voice change.    Hematological: Negative for adenopathy.           Objective   Physical Exam   Constitutional: He is oriented to person, place, and time. He appears well-developed and well-nourished.   HENT:   Head: Normocephalic and atraumatic.   Right Ear: Hearing, tympanic membrane, external ear and ear canal normal.   Left Ear: Hearing, tympanic membrane, external ear and ear canal normal.   Nose: Septal deviation present. No mucosal edema, rhinorrhea or nasal deformity. No epistaxis. Right sinus exhibits no maxillary sinus tenderness and no frontal sinus tenderness. Left sinus exhibits no maxillary sinus tenderness and no frontal sinus tenderness.   Mouth/Throat: Uvula is midline, oropharynx is clear and moist and mucous membranes are normal. No trismus in the jaw. Normal  dentition. No oropharyngeal exudate or posterior oropharyngeal edema. No tonsillar exudate.       Eyes: Conjunctivae are normal.   Neck: Normal range of motion. Neck supple. No JVD present. No tracheal deviation present. No thyromegaly present.   Cardiovascular: Normal rate.    Pulmonary/Chest: Effort normal.   Musculoskeletal: Normal range of motion.   Lymphadenopathy:        Head (right side): No submental, no submandibular, no tonsillar, no preauricular, no posterior auricular and no occipital adenopathy present.        Head (left side): No submental, no submandibular, no tonsillar, no preauricular, no posterior auricular and no occipital adenopathy present.     He has no cervical adenopathy.        Right cervical: No superficial cervical, no deep cervical and no posterior cervical adenopathy present.       Left cervical: No superficial cervical, no deep cervical and no posterior cervical adenopathy present.   Neurological: He is alert and oriented to person, place, and time. No cranial nerve deficit.   Skin: Skin is warm.   Psychiatric: He has a normal mood and affect. His speech is normal and behavior is normal. Thought content normal.   Nursing note and vitals reviewed.          Assessment/Plan   Venkatesh was seen today for follow-up.    Diagnoses and all orders for this visit:    LPRD (laryngopharyngeal reflux disease)  -     Ambulatory Referral to Gastroenterology    Obstructive sleep apnea syndrome    Pharyngeal dysphagia  -     Ambulatory Referral to Gastroenterology    Patient see Dr. Selby regarding sleep apnea.  Trial CPAP  Is suggested and then follow-up after that to consider surgical options if he does not have a successful trial.   I am suggesting g suggesting a GI referral because he still having trouble swallowing and he points to damage chest.    He's had a normal barium swallow is been treated aggressively for reflux he may need an EGD and possible dilation.

## 2017-11-01 DIAGNOSIS — G47.33 OSA (OBSTRUCTIVE SLEEP APNEA): Primary | ICD-10-CM

## 2017-12-04 ENCOUNTER — OFFICE VISIT (OUTPATIENT)
Dept: GASTROENTEROLOGY | Facility: CLINIC | Age: 47
End: 2017-12-04

## 2017-12-04 VITALS
BODY MASS INDEX: 37.3 KG/M2 | HEIGHT: 72 IN | WEIGHT: 275.4 LBS | SYSTOLIC BLOOD PRESSURE: 138 MMHG | HEART RATE: 93 BPM | DIASTOLIC BLOOD PRESSURE: 82 MMHG

## 2017-12-04 DIAGNOSIS — R13.12 OROPHARYNGEAL DYSPHAGIA: Primary | Chronic | ICD-10-CM

## 2017-12-04 DIAGNOSIS — Z80.0 FH: ESOPHAGEAL CANCER: ICD-10-CM

## 2017-12-04 PROCEDURE — 99214 OFFICE O/P EST MOD 30 MIN: CPT | Performed by: NURSE PRACTITIONER

## 2017-12-04 RX ORDER — DEXTROSE AND SODIUM CHLORIDE 5; .45 G/100ML; G/100ML
30 INJECTION, SOLUTION INTRAVENOUS CONTINUOUS PRN
Status: CANCELLED | OUTPATIENT
Start: 2018-01-11

## 2017-12-04 NOTE — PROGRESS NOTES
"Chief Complaint   Patient presents with   • Heartburn     reflux   • Difficulty Swallowing       Subjective    Venkatesh Grimes is a 47 y.o. male. he is being seen for consultation today at the request of Nikolas Anne MD    Heartburn   He complains of coughing and a sore throat. He reports no abdominal pain or no nausea. Pertinent negatives include no fatigue.   Difficulty Swallowing   This is a recurrent problem. The problem occurs daily. The problem has been waxing and waning. Associated symptoms include coughing and a sore throat. Pertinent negatives include no abdominal pain, arthralgias, chills, diaphoresis, fatigue, fever, nausea or vomiting.   47-year-old male presents to discuss painful swallowing.  Reports symptoms started several months ago he noticed constant \"sore throat\" with cough and urge  to clear throat constantly.  Reports anytime he swallows he feels food all the way down or even when he swallows pills.  Denies any nausea or vomiting.  Denies any choking. Has been evaluated by ENT.  States he always has to have water he needed his symptoms.  He also reports his sister was diagnosed with esophageal cancer.  He is a former smoker currently dips.  Plan; we'll schedule patient for EGD due to painful swallowing.  Continue Protonix daily.  Follow-up after test return to office sooner if needed      The following portions of the patient's history were reviewed and updated as appropriate:   Past Medical History:   Diagnosis Date   • CTS (carpal tunnel syndrome)    • Hyperlipidemia    • Other obesity    • Tobacco dependence syndrome      Past Surgical History:   Procedure Laterality Date   • CARPAL TUNNEL RELEASE     • COLONOSCOPY     • ECHO - CONVERTED  06/10/2016    Ef 55-60%.Impiared LV relaxation. [facility]   • HAND SURGERY  11/06/2012    Excision. Osteochondroma & exostosis of right index finger of proximal phalanx   • ROTATOR CUFF REPAIR     • TONSILLECTOMY       Family History "   Problem Relation Age of Onset   • Diabetes Mother    • Heart disease Mother      ischemic   • Hypertension Mother    • Heart failure Mother      congestive   • Hypertension Father    • Diabetes Father    • Stroke Father    • Cancer Father    • Heart disease Father    • Hypertension Sister    • Diabetes Sister    • Thyroid cancer Sister    • Cancer Sister    • Heart disease Brother    • Hypertension Brother    • Leukemia Other    • Cancer Paternal Grandmother        Current Outpatient Prescriptions   Medication Sig Dispense Refill   • fexofenadine (ALLEGRA) 180 MG tablet Take 180 mg by mouth Daily.     • pantoprazole (PROTONIX) 40 MG EC tablet Take 1 tablet by mouth Daily. 30 tablet 2     No current facility-administered medications for this visit.      Allergies   Allergen Reactions   • Iodine    • Shellfish-Derived Products      Social History     Social History   • Marital status:      Spouse name: N/A   • Number of children: N/A   • Years of education: N/A     Social History Main Topics   • Smoking status: Former Smoker     Packs/day: 1.00     Types: Cigarettes   • Smokeless tobacco: Current User     Types: Chew   • Alcohol use Yes   • Drug use: No   • Sexual activity: Yes     Other Topics Concern   • None     Social History Narrative       Review of Systems  Review of Systems   Constitutional: Negative for activity change, appetite change, chills, diaphoresis, fatigue, fever and unexpected weight change.   HENT: Positive for sore throat and trouble swallowing (painful swallowing).    Respiratory: Positive for cough. Negative for shortness of breath.    Gastrointestinal: Negative for abdominal distention, abdominal pain, anal bleeding, blood in stool, constipation, diarrhea, nausea, rectal pain and vomiting.   Musculoskeletal: Negative for arthralgias.   Skin: Negative for pallor.   Neurological: Negative for light-headedness.        /82 (BP Location: Left arm, Patient Position: Sitting, Cuff Size:  "Adult)  Pulse 93  Ht 72\" (182.9 cm)  Wt 275 lb 6.4 oz (125 kg)  BMI 37.35 kg/m2    Objective    Physical Exam   Constitutional: He is oriented to person, place, and time. He appears well-developed and well-nourished. He is cooperative. No distress.   HENT:   Head: Normocephalic and atraumatic.   Neck: Normal range of motion. Neck supple. No thyromegaly present.   Cardiovascular: Normal rate, regular rhythm and normal heart sounds.    Pulmonary/Chest: Effort normal and breath sounds normal. He has no wheezes. He has no rhonchi. He has no rales.   Abdominal: Soft. Normal appearance and bowel sounds are normal. He exhibits no shifting dullness and no distension. There is no hepatosplenomegaly. There is no tenderness. There is no rigidity and no guarding. No hernia.   Lymphadenopathy:     He has no cervical adenopathy.   Neurological: He is alert and oriented to person, place, and time.   Skin: Skin is warm, dry and intact. No rash noted. No pallor.   Psychiatric: He has a normal mood and affect. His speech is normal.     Office Visit on 08/01/2017   Component Date Value Ref Range Status   • WBC 08/01/2017 6.97  3.20 - 9.80 10*3/mm3 Final   • RBC 08/01/2017 5.10  4.37 - 5.74 10*6/mm3 Final   • Hemoglobin 08/01/2017 15.7  13.7 - 17.3 g/dL Final   • Hematocrit 08/01/2017 45.5  39.0 - 49.0 % Final   • MCV 08/01/2017 89.2  80.0 - 98.0 fL Final   • MCH 08/01/2017 30.8  26.5 - 34.0 pg Final   • MCHC 08/01/2017 34.5  31.5 - 36.3 g/dL Final   • RDW 08/01/2017 12.4  11.5 - 14.5 % Final   • RDW-SD 08/01/2017 40.0  35.1 - 43.9 fl Final   • MPV 08/01/2017 10.8  8.0 - 12.0 fL Final   • Platelets 08/01/2017 318  150 - 450 10*3/mm3 Final   • Glucose 08/01/2017 86  60 - 100 mg/dL Final   • BUN 08/01/2017 17  7 - 21 mg/dL Final   • Creatinine 08/01/2017 0.99  0.70 - 1.30 mg/dL Final   • Sodium 08/01/2017 138  137 - 145 mmol/L Final   • Potassium 08/01/2017 3.9  3.5 - 5.1 mmol/L Final   • Chloride 08/01/2017 100  95 - 110 mmol/L " Final   • CO2 08/01/2017 26.0  22.0 - 31.0 mmol/L Final   • Calcium 08/01/2017 10.1  8.4 - 10.2 mg/dL Final   • Total Protein 08/01/2017 7.9  6.3 - 8.6 g/dL Final   • Albumin 08/01/2017 5.10* 3.40 - 4.80 g/dL Final   • ALT (SGPT) 08/01/2017 44  21 - 72 U/L Final   • AST (SGOT) 08/01/2017 56  17 - 59 U/L Final   • Alkaline Phosphatase 08/01/2017 77  38 - 126 U/L Final   • Total Bilirubin 08/01/2017 0.6  0.2 - 1.3 mg/dL Final   • eGFR Non African Amer 08/01/2017 82  63 - 147 mL/min/1.73 Final   • Globulin 08/01/2017 2.8  2.3 - 3.5 gm/dL Final   • A/G Ratio 08/01/2017 1.8  1.1 - 1.8 g/dL Final   • BUN/Creatinine Ratio 08/01/2017 17.2  7.0 - 25.0 Final   • Anion Gap 08/01/2017 12.0  5.0 - 15.0 mmol/L Final   • Hemoglobin A1C 08/01/2017 5.4  4 - 5.6 % Final   • Vitamin B-12 08/01/2017 517  239 - 931 pg/mL Final   • TSH 08/01/2017 3.480  0.460 - 4.680 mIU/mL Final   • Free T4 08/01/2017 1.09  0.78 - 2.19 ng/dL Final   • Total Cholesterol 08/01/2017 227* 0 - 199 mg/dL Final   • Triglycerides 08/01/2017 155  20 - 199 mg/dL Final   • HDL Cholesterol 08/01/2017 58* 60 - 200 mg/dL Final   • LDL Cholesterol  08/01/2017 138* 0 - 129 mg/dL Final   • VLDL Cholesterol 08/01/2017 31  mg/dL Final   • LDL/HDL Ratio 08/01/2017 2.38  0.00 - 3.55 Final   • Iron 08/01/2017 71  49 - 181 mcg/dL Final     Assessment/Plan      1. Oropharyngeal dysphagia    2. FH: esophageal cancer    .       Orders placed during this encounter include:      ESOPHAGOGASTRODUODENOSCOPY possible dilation-Prefer afternoon Appointment  (N/A)    Review and/or summary of lab tests, radiology, procedures, medications. Review and summary of old records and obtaining of history. The risks and benefits of my recommendations, as well as other treatment options were discussed with the patient today. Questions were answered.    No orders of the defined types were placed in this encounter.      Follow-up: Return in about 4 weeks (around 1/1/2018).          This document has  been electronically signed by CARROLL Garcia on December 4, 2017 1:30 PM             Results for orders placed or performed in visit on 08/01/17   Lipid Panel With LDL / HDL Ratio   Result Value Ref Range    Total Cholesterol 227 (H) 0 - 199 mg/dL    Triglycerides 155 20 - 199 mg/dL    HDL Cholesterol 58 (L) 60 - 200 mg/dL    LDL Cholesterol  138 (H) 0 - 129 mg/dL    VLDL Cholesterol 31 mg/dL    LDL/HDL Ratio 2.38 0.00 - 3.55   CBC (No Diff)   Result Value Ref Range    WBC 6.97 3.20 - 9.80 10*3/mm3    RBC 5.10 4.37 - 5.74 10*6/mm3    Hemoglobin 15.7 13.7 - 17.3 g/dL    Hematocrit 45.5 39.0 - 49.0 %    MCV 89.2 80.0 - 98.0 fL    MCH 30.8 26.5 - 34.0 pg    MCHC 34.5 31.5 - 36.3 g/dL    RDW 12.4 11.5 - 14.5 %    RDW-SD 40.0 35.1 - 43.9 fl    MPV 10.8 8.0 - 12.0 fL    Platelets 318 150 - 450 10*3/mm3   TSH   Result Value Ref Range    TSH 3.480 0.460 - 4.680 mIU/mL   T4, Free   Result Value Ref Range    Free T4 1.09 0.78 - 2.19 ng/dL   Iron   Result Value Ref Range    Iron 71 49 - 181 mcg/dL   Hemoglobin A1c   Result Value Ref Range    Hemoglobin A1C 5.4 4 - 5.6 %   Vitamin B12   Result Value Ref Range    Vitamin B-12 517 239 - 931 pg/mL   Comprehensive Metabolic Panel   Result Value Ref Range    Glucose 86 60 - 100 mg/dL    BUN 17 7 - 21 mg/dL    Creatinine 0.99 0.70 - 1.30 mg/dL    Sodium 138 137 - 145 mmol/L    Potassium 3.9 3.5 - 5.1 mmol/L    Chloride 100 95 - 110 mmol/L    CO2 26.0 22.0 - 31.0 mmol/L    Calcium 10.1 8.4 - 10.2 mg/dL    Total Protein 7.9 6.3 - 8.6 g/dL    Albumin 5.10 (H) 3.40 - 4.80 g/dL    ALT (SGPT) 44 21 - 72 U/L    AST (SGOT) 56 17 - 59 U/L    Alkaline Phosphatase 77 38 - 126 U/L    Total Bilirubin 0.6 0.2 - 1.3 mg/dL    eGFR Non  Amer 82 63 - 147 mL/min/1.73    Globulin 2.8 2.3 - 3.5 gm/dL    A/G Ratio 1.8 1.1 - 1.8 g/dL    BUN/Creatinine Ratio 17.2 7.0 - 25.0    Anion Gap 12.0 5.0 - 15.0 mmol/L   Results for orders placed or performed during the hospital encounter of 02/02/17  "  POCT Influenza A/B   Result Value Ref Range    Rapid Influenza A Ag positive     Rapid Influenza B Ag negative     Internal Control Passed Passed    Lot Number [id number]     Expiration Date 6-30-18    Results for orders placed or performed in visit on 11/06/12   Converted Surgical Pathology   Result Value Ref Range    Spec Descr 1 SPECIMEN(S): A OSTEOCHRONDROMA     Preoperative Diagnosis         PREOPERATIVE DIAGNOSIS:  Mass, index finger, right      Postoperative Diagnosis         POSTOPERATIVE DIAGNOSIS:  Mass, right index finger      Gross Description         GROSS DESCRIPTION:  The specimen is labeled \"mass right index finger osteochondroma\".  Multiple bony fragments measure 2.5 x 2.0 x 0.5 cm together.  Totally  submitted for decalcification.      Final Diagnosis         FINAL DIAGNOSIS:  BONE, RIGHT INDEX FINGER:       MATURE BONE, CONSISTENT WITH OSTEOCHONDROMA.      CONVERTED (HISTORICAL) FINAL PATHOLOGIST       Diagnostician:  NAOMI DEL ROSARIO M.D.  Pathologist  Electronically Signed 11/13/2012      Diagnosis Code   DIAGNOSIS CODE:  3      Results for orders placed or performed in visit on 11/22/11   Converted Surgical Pathology   Result Value Ref Range    Spec Descr 1 SPECIMEN(S): A SHAVINGS RIGHT SHOULDER     Preoperative Diagnosis         PREOPERATIVE DIAGNOSIS:  Right shoulder arthroscopy      Postoperative Diagnosis         POSTOPERATIVE DIAGNOSIS:  Right shoulder arthroscopy      Gross Description         GROSS DESCRIPTION:  The specimen consists of fragments of arthroscopy shavings from the  right knee measuring up to 0.5 cm in greatest dimension in aggregate  measuring 1.0 x 1.0 x 1.0 cm.  Representative sections are embedded.      Final Diagnosis         FINAL DIAGNOSIS:  FRAGMENTS OF BONE, SKELETAL MUSCLE AND OTHER SOFT TISSUES, RIGHT       SHOULDER.      CONVERTED (HISTORICAL) FINAL PATHOLOGIST       Diagnostician:  TOYA FLOYD M.D.  Pathologist  Electronically Signed 11/23/2011      " Diagnosis Code   DIAGNOSIS CODE:  3

## 2018-01-03 ENCOUNTER — OFFICE VISIT (OUTPATIENT)
Dept: SLEEP MEDICINE | Facility: HOSPITAL | Age: 48
End: 2018-01-03

## 2018-01-03 VITALS
BODY MASS INDEX: 37.65 KG/M2 | DIASTOLIC BLOOD PRESSURE: 88 MMHG | HEIGHT: 72 IN | SYSTOLIC BLOOD PRESSURE: 144 MMHG | WEIGHT: 278 LBS

## 2018-01-03 DIAGNOSIS — G47.33 OBSTRUCTIVE SLEEP APNEA, ADULT: Primary | ICD-10-CM

## 2018-01-03 PROCEDURE — 99213 OFFICE O/P EST LOW 20 MIN: CPT | Performed by: INTERNAL MEDICINE

## 2018-01-03 NOTE — PROGRESS NOTES
CHIEF COMPLAINT: follow up sleep study results    HPI:The patient is a 47 y.o. male.  He returns to sleep clinic to discuss the results of the recent sleep study.  He had a home sleep study on 10/20/2017.  The AHI was 8.7.  The patient did not have any significant cardiac arrhythmias    INTERVAL MEDICAL HISTORY: got autocpap. He can tolerate CPAP for ~ 6 hours.  He has some anxiety when he wakes up after 6 hours and tries to put the mask back on.  This is not present when he first goes to bed at night.  He notices less EDS and alertness throughout the day. He has no nocturia.  CPAP Data:    Time frame: 11/08/2017 - 01/02/2018    Compliance 92.9%, 69.6%  CPAP/APAP settings: 5-20  Average 90% pressure: 8.4 cmH2O  Leak: 4.5 minutes  Average AHI 2.5 events/hr  Mask type: FFM  Machine Type: Respironics from [employer]        MEDICATIONS:   Current Outpatient Prescriptions:   •  fexofenadine (ALLEGRA) 180 MG tablet, Take 180 mg by mouth Daily., Disp: , Rfl:   •  pantoprazole (PROTONIX) 40 MG EC tablet, Take 1 tablet by mouth Daily., Disp: 30 tablet, Rfl: 2    PHYSICAL EXAM  Vital Signs (last 24 hours)       01/02 0700  -  01/03 0659 01/03 0700  -  01/03 1608   Most Recent    BP     144/88     144/88          Gen:  No distress, appears stated age, alert, oriented to person, place, and time  Heent:   NC/AT, PERRLA, EOMI, anicteric sclera    External ears/nose normal, OP clear, Mallamati 4  LUNGS: Clear breath sounds bilaterally, nonlabored breathing  CV:  Normal S1/S2, without murmur, no peripheral edema  ABD:  Non tender, bowel sounds WNL  EXT:  No cyanosis or clubbing      IMPRESSION AND PLAN:    1.  Obstructive sleep apnea with an AHI of 8.7 from home sleep test on October 20, 2017.  2.  Great compliance.  No change therapy at this point.  Patient's been advised is his anxiety becomes worse will be happy to treat him for July anxiety or sleep maintenance insomnia.  3.  Return to clinic in 1 year  All of the patient's  questions were answered. He states understanding and agreement with my assessment and plan as above.  Total time 15 min, more than half spent in face to face counseling and coordination of care.           This document has been electronically signed by Anders Selby MD on January 3, 2018           CC: Woo Lamas MD          No ref. provider found

## 2018-01-11 ENCOUNTER — HOSPITAL ENCOUNTER (OUTPATIENT)
Facility: HOSPITAL | Age: 48
Setting detail: HOSPITAL OUTPATIENT SURGERY
Discharge: HOME | End: 2018-01-11
Attending: INTERNAL MEDICINE | Admitting: INTERNAL MEDICINE

## 2018-01-11 ENCOUNTER — ANESTHESIA EVENT (OUTPATIENT)
Dept: GASTROENTEROLOGY | Facility: HOSPITAL | Age: 48
End: 2018-01-11

## 2018-01-11 ENCOUNTER — ANESTHESIA (OUTPATIENT)
Dept: GASTROENTEROLOGY | Facility: HOSPITAL | Age: 48
End: 2018-01-11

## 2018-01-11 VITALS
SYSTOLIC BLOOD PRESSURE: 151 MMHG | HEIGHT: 72 IN | HEART RATE: 76 BPM | OXYGEN SATURATION: 95 % | DIASTOLIC BLOOD PRESSURE: 90 MMHG | BODY MASS INDEX: 36.43 KG/M2 | WEIGHT: 268.96 LBS | RESPIRATION RATE: 18 BRPM | TEMPERATURE: 97.6 F

## 2018-01-11 DIAGNOSIS — R13.12 OROPHARYNGEAL DYSPHAGIA: ICD-10-CM

## 2018-01-11 DIAGNOSIS — Z80.0 FH: ESOPHAGEAL CANCER: ICD-10-CM

## 2018-01-11 PROCEDURE — 25010000002 PROPOFOL 10 MG/ML EMULSION: Performed by: NURSE ANESTHETIST, CERTIFIED REGISTERED

## 2018-01-11 PROCEDURE — 88305 TISSUE EXAM BY PATHOLOGIST: CPT | Performed by: PATHOLOGY

## 2018-01-11 PROCEDURE — 25010000002 FENTANYL CITRATE (PF) 100 MCG/2ML SOLUTION: Performed by: NURSE ANESTHETIST, CERTIFIED REGISTERED

## 2018-01-11 PROCEDURE — 88305 TISSUE EXAM BY PATHOLOGIST: CPT | Performed by: INTERNAL MEDICINE

## 2018-01-11 PROCEDURE — 43248 EGD GUIDE WIRE INSERTION: CPT | Performed by: INTERNAL MEDICINE

## 2018-01-11 PROCEDURE — 43239 EGD BIOPSY SINGLE/MULTIPLE: CPT | Performed by: INTERNAL MEDICINE

## 2018-01-11 RX ORDER — PROMETHAZINE HYDROCHLORIDE 25 MG/ML
12.5 INJECTION, SOLUTION INTRAMUSCULAR; INTRAVENOUS ONCE AS NEEDED
Status: DISCONTINUED | OUTPATIENT
Start: 2018-01-11 | End: 2018-01-11 | Stop reason: HOSPADM

## 2018-01-11 RX ORDER — FENTANYL CITRATE 50 UG/ML
INJECTION, SOLUTION INTRAMUSCULAR; INTRAVENOUS AS NEEDED
Status: DISCONTINUED | OUTPATIENT
Start: 2018-01-11 | End: 2018-01-11 | Stop reason: SURG

## 2018-01-11 RX ORDER — PROMETHAZINE HYDROCHLORIDE 25 MG/1
25 TABLET ORAL ONCE AS NEEDED
Status: DISCONTINUED | OUTPATIENT
Start: 2018-01-11 | End: 2018-01-11 | Stop reason: HOSPADM

## 2018-01-11 RX ORDER — PROPOFOL 10 MG/ML
VIAL (ML) INTRAVENOUS AS NEEDED
Status: DISCONTINUED | OUTPATIENT
Start: 2018-01-11 | End: 2018-01-11 | Stop reason: SURG

## 2018-01-11 RX ORDER — ONDANSETRON 2 MG/ML
4 INJECTION INTRAMUSCULAR; INTRAVENOUS ONCE AS NEEDED
Status: DISCONTINUED | OUTPATIENT
Start: 2018-01-11 | End: 2018-01-11 | Stop reason: HOSPADM

## 2018-01-11 RX ORDER — LIDOCAINE HYDROCHLORIDE 10 MG/ML
INJECTION, SOLUTION INFILTRATION; PERINEURAL AS NEEDED
Status: DISCONTINUED | OUTPATIENT
Start: 2018-01-11 | End: 2018-01-11 | Stop reason: SURG

## 2018-01-11 RX ORDER — DEXTROSE AND SODIUM CHLORIDE 5; .45 G/100ML; G/100ML
30 INJECTION, SOLUTION INTRAVENOUS CONTINUOUS PRN
Status: DISCONTINUED | OUTPATIENT
Start: 2018-01-11 | End: 2018-01-11 | Stop reason: HOSPADM

## 2018-01-11 RX ORDER — PROMETHAZINE HYDROCHLORIDE 25 MG/1
25 SUPPOSITORY RECTAL ONCE AS NEEDED
Status: DISCONTINUED | OUTPATIENT
Start: 2018-01-11 | End: 2018-01-11 | Stop reason: HOSPADM

## 2018-01-11 RX ORDER — DEXAMETHASONE SODIUM PHOSPHATE 4 MG/ML
8 INJECTION, SOLUTION INTRA-ARTICULAR; INTRALESIONAL; INTRAMUSCULAR; INTRAVENOUS; SOFT TISSUE ONCE AS NEEDED
Status: DISCONTINUED | OUTPATIENT
Start: 2018-01-11 | End: 2018-01-11 | Stop reason: HOSPADM

## 2018-01-11 RX ADMIN — DEXTROSE AND SODIUM CHLORIDE 30 ML/HR: 5; 450 INJECTION, SOLUTION INTRAVENOUS at 15:56

## 2018-01-11 RX ADMIN — FENTANYL CITRATE 100 MCG: 50 INJECTION, SOLUTION INTRAMUSCULAR; INTRAVENOUS at 16:55

## 2018-01-11 RX ADMIN — LIDOCAINE HYDROCHLORIDE 100 MG: 10 INJECTION, SOLUTION INFILTRATION; PERINEURAL at 16:55

## 2018-01-11 RX ADMIN — PROPOFOL 100 MG: 10 INJECTION, EMULSION INTRAVENOUS at 16:55

## 2018-01-11 RX ADMIN — PROPOFOL 50 MG: 10 INJECTION, EMULSION INTRAVENOUS at 17:00

## 2018-01-11 NOTE — H&P
"                                                   Progress Notes       Encounter Date: 1/11/2018           Vijay baker    Gastroenterology         Expand All Collapse All                          Hide copied text        Hover for attribution information                                                                                                                                                                                                                                                         Chief Complaint       Patient presents with       •     Heartburn                   reflux       •     Difficulty Swallowing                        Subjective              Venkatesh Grimes is a 47 y.o. male. he is being seen for consultation today at the request of Nikolas Anne MD         Heartburn     He complains of coughing and a sore throat. He reports no abdominal pain or no nausea. Pertinent negatives include no fatigue.     Difficulty Swallowing     This is a recurrent problem. The problem occurs daily. The problem has been waxing and waning. Associated symptoms include coughing and a sore throat. Pertinent negatives include no abdominal pain, arthralgias, chills, diaphoresis, fatigue, fever, nausea or vomiting.     47-year-old male presents to discuss painful swallowing.  Reports symptoms started several months ago he noticed constant \"sore throat\" with cough and urge  to clear throat constantly.  Reports anytime he swallows he feels food all the way down or even when he swallows pills.  Denies any nausea or vomiting.  Denies any choking. Has been evaluated by ENT.  States he always has to have water he needed his symptoms.    He also reports his sister was diagnosed with esophageal cancer.  He is a former smoker currently dips.    Plan; we'll schedule patient for EGD due to painful swallowing.  Continue Protonix daily.  Follow-up after test return to office sooner if needed              The following " portions of the patient's history were reviewed and updated as appropriate:          Medical History                                                                                                                  Surgical History                                                                                                                                                                                                                    Family History       Problem     Relation     Age of Onset       •     Diabetes     Mother             •     Heart disease     Mother                         ischemic       •     Hypertension     Mother             •     Heart failure     Mother                         congestive       •     Hypertension     Father             •     Diabetes     Father             •     Stroke     Father             •     Cancer     Father             •     Heart disease     Father             •     Hypertension     Sister             •     Diabetes     Sister             •     Thyroid cancer     Sister             •     Cancer     Sister             •     Heart disease     Brother             •     Hypertension     Brother             •     Leukemia     Other             •     Cancer     Paternal Grandmother                           Current Medications                                                                                                                               Allergies       Allergen     Reactions       •     Iodine             •     Shellfish-Derived Products                       Social History                                                                                                                                                                                                                                                                                                                                                    Review of Systems    Review of Systems  "    Constitutional: Negative for activity change, appetite change, chills, diaphoresis, fatigue, fever and unexpected weight change.     HENT: Positive for sore throat and trouble swallowing (painful swallowing).      Respiratory: Positive for cough. Negative for shortness of breath.      Gastrointestinal: Negative for abdominal distention, abdominal pain, anal bleeding, blood in stool, constipation, diarrhea, nausea, rectal pain and vomiting.     Musculoskeletal: Negative for arthralgias.     Skin: Negative for pallor.     Neurological: Negative for light-headedness.                                    /82 (BP Location: Left arm, Patient Position: Sitting, Cuff Size: Adult)  Pulse 93  Ht 72\" (182.9 cm)  Wt 275 lb 6.4 oz (125 kg)  BMI 37.35 kg/m2                    Objective                                                                                                                                                                                                                                                                                                                                                                                                                                                                                                                                                                                                                                                                                                                                                                                                                                                                                                                                                                                                                                                                                                                                                                                                  "                                                                                                                                                                                                                                                                                     Assessment/Plan                     1.     Oropharyngeal dysphagia        2.     FH: esophageal cancer        .               Orders placed during this encounter include:              ESOPHAGOGASTRODUODENOSCOPY possible dilation-Prefer afternoon Appointment  (N/A)         Review and/or summary of lab tests, radiology, procedures, medications. Review and summary of old records and obtaining of history. The risks and benefits of my recommendations, as well as other treatment options were discussed with the patient today. Questions were answered.         No orders of the defined types were placed in this encounter.              Follow-up: Return in about 4 weeks (around 1/1/2018).                       This document has been electronically signed by CARROLL Garcia on December 4, 2017 1:30 PM                                       Results for orders placed or performed in visit on 08/01/17       Lipid Panel With LDL / HDL Ratio       Result     Value     Ref Range             Total Cholesterol     227 (H)     0 - 199 mg/dL             Triglycerides     155     20 - 199 mg/dL             HDL Cholesterol     58 (L)     60 - 200 mg/dL             LDL Cholesterol      138 (H)     0 - 129 mg/dL             VLDL Cholesterol     31     mg/dL             LDL/HDL Ratio     2.38     0.00 - 3.55       CBC (No Diff)       Result     Value     Ref Range             WBC     6.97     3.20 - 9.80 10*3/mm3             RBC     5.10     4.37 - 5.74 10*6/mm3             Hemoglobin     15.7     13.7 - 17.3 g/dL             Hematocrit     45.5     39.0 - 49.0 %             MCV     89.2     80.0 - 98.0 fL             MCH     30.8     26.5 - 34.0 pg             MCHC      34.5     31.5 - 36.3 g/dL             RDW     12.4     11.5 - 14.5 %             RDW-SD     40.0     35.1 - 43.9 fl             MPV     10.8     8.0 - 12.0 fL             Platelets     318     150 - 450 10*3/mm3       TSH       Result     Value     Ref Range             TSH     3.480     0.460 - 4.680 mIU/mL       T4, Free       Result     Value     Ref Range             Free T4     1.09     0.78 - 2.19 ng/dL       Iron       Result     Value     Ref Range             Iron     71     49 - 181 mcg/dL       Hemoglobin A1c       Result     Value     Ref Range             Hemoglobin A1C     5.4     4 - 5.6 %       Vitamin B12       Result     Value     Ref Range             Vitamin B-12     517     239 - 931 pg/mL       Comprehensive Metabolic Panel       Result     Value     Ref Range             Glucose     86     60 - 100 mg/dL             BUN     17     7 - 21 mg/dL             Creatinine     0.99     0.70 - 1.30 mg/dL             Sodium     138     137 - 145 mmol/L             Potassium     3.9     3.5 - 5.1 mmol/L             Chloride     100     95 - 110 mmol/L             CO2     26.0     22.0 - 31.0 mmol/L             Calcium     10.1     8.4 - 10.2 mg/dL             Total Protein     7.9     6.3 - 8.6 g/dL             Albumin     5.10 (H)     3.40 - 4.80 g/dL             ALT (SGPT)     44     21 - 72 U/L             AST (SGOT)     56     17 - 59 U/L             Alkaline Phosphatase     77     38 - 126 U/L             Total Bilirubin     0.6     0.2 - 1.3 mg/dL             eGFR Non  Amer     82     63 - 147 mL/min/1.73             Globulin     2.8     2.3 - 3.5 gm/dL             A/G Ratio     1.8     1.1 - 1.8 g/dL             BUN/Creatinine Ratio     17.2     7.0 - 25.0             Anion Gap     12.0     5.0 - 15.0 mmol/L       Results for orders placed or performed during the hospital encounter of 02/02/17       POCT Influenza A/B       Result     Value     Ref Range             Rapid Influenza A  "Ag     positive                   Rapid Influenza B Ag     negative                   Internal Control     Passed     Passed             Lot Number     [id number]                   Expiration Date     6-30-18             Results for orders placed or performed in visit on 11/06/12       Converted Surgical Pathology       Result     Value     Ref Range             Spec Descr 1     SPECIMEN(S): A OSTEOCHRONDROMA                   Preoperative Diagnosis                                    PREOPERATIVE DIAGNOSIS:    Mass, index finger, right               Postoperative Diagnosis                                    POSTOPERATIVE DIAGNOSIS:    Mass, right index finger               Gross Description                                    GROSS DESCRIPTION:    The specimen is labeled \"mass right index finger osteochondroma\".    Multiple bony fragments measure 2.5 x 2.0 x 0.5 cm together.  Totally    submitted for decalcification.               Final Diagnosis                                    FINAL DIAGNOSIS:    BONE, RIGHT INDEX FINGER:         MATURE BONE, CONSISTENT WITH OSTEOCHONDROMA.               CONVERTED (HISTORICAL) FINAL PATHOLOGIST                               Diagnostician:  NAMOI DEL ROSARIO M.D.    Pathologist    Electronically Signed 11/13/2012               Diagnosis Code          DIAGNOSIS CODE:    3               Results for orders placed or performed in visit on 11/22/11       Converted Surgical Pathology       Result     Value     Ref Range             Spec Descr 1     SPECIMEN(S): A SHAVINGS RIGHT SHOULDER                   Preoperative Diagnosis                                    PREOPERATIVE DIAGNOSIS:    Right shoulder arthroscopy               Postoperative Diagnosis                                    POSTOPERATIVE DIAGNOSIS:    Right shoulder arthroscopy               Gross Description                                    GROSS DESCRIPTION:    The specimen consists of fragments of arthroscopy shavings from " the    right knee measuring up to 0.5 cm in greatest dimension in aggregate    measuring 1.0 x 1.0 x 1.0 cm.  Representative sections are embedded.               Final Diagnosis                                    FINAL DIAGNOSIS:    FRAGMENTS OF BONE, SKELETAL MUSCLE AND OTHER SOFT TISSUES, RIGHT         SHOULDER.               CONVERTED (HISTORICAL) FINAL PATHOLOGIST                               Diagnostician:  TOYA FLOYD M.D.    Pathologist    Electronically Signed 11/23/2011               Diagnosis Code          DIAGNOSIS CODE:    3                                           Office Visit on 12/4/2017                          Detailed Report

## 2018-01-11 NOTE — PLAN OF CARE
Problem: Patient Care Overview (Adult)  Goal: Plan of Care Review  Outcome: Ongoing (interventions implemented as appropriate)   01/11/18 1703   Coping/Psychosocial Response Interventions   Plan Of Care Reviewed With patient   Patient Care Overview   Progress no change       Problem: GI Endoscopy (Adult)  Goal: Signs and Symptoms of Listed Potential Problems Will be Absent or Manageable (GI Endoscopy)  Outcome: Ongoing (interventions implemented as appropriate)   01/11/18 1703   GI Endoscopy   Problems Assessed (GI Endoscopy) all   Problems Present (GI Endoscopy) none

## 2018-01-11 NOTE — ANESTHESIA PREPROCEDURE EVALUATION
Anesthesia Evaluation     NPO Solid Status: > 8 hours  NPO Liquid Status: > 8 hours     Airway   Mallampati: III  TM distance: >3 FB  Neck ROM: full  no difficulty expected  Dental - normal exam     Pulmonary - normal exam   Cardiovascular - normal exam        Neuro/Psych  GI/Hepatic/Renal/Endo      Musculoskeletal     Abdominal  - normal exam   Substance History      OB/GYN          Other                                                Anesthesia Plan    ASA 3     MAC     Anesthetic plan and risks discussed with patient.    Plan discussed with CRNA.

## 2018-01-11 NOTE — PLAN OF CARE
Problem: Patient Care Overview (Adult)  Goal: Plan of Care Review  Outcome: Outcome(s) achieved Date Met: 01/11/18 01/11/18 1720   Coping/Psychosocial Response Interventions   Plan Of Care Reviewed With patient   Patient Care Overview   Progress no change   Outcome Evaluation   Outcome Summary/Follow up Plan pt alert, vss       Problem: GI Endoscopy (Adult)  Goal: Signs and Symptoms of Listed Potential Problems Will be Absent or Manageable (GI Endoscopy)  Outcome: Outcome(s) achieved Date Met: 01/11/18 01/11/18 1720   GI Endoscopy   Problems Assessed (GI Endoscopy) all   Problems Present (GI Endoscopy) none

## 2018-01-11 NOTE — ANESTHESIA POSTPROCEDURE EVALUATION
Patient: Venkatesh Grimes    Procedure Summary     Date Anesthesia Start Anesthesia Stop Room / Location    01/11/18 1632 1701 [facility] ENDOSCOPY 2 / [facility] ENDOSCOPY       Procedure Diagnosis Surgeon Provider    ESOPHAGOGASTRODUODENOSCOPY possible dilation (N/A Esophagus) Oropharyngeal dysphagia; FH: esophageal cancer  (Oropharyngeal dysphagia [R13.12]; FH: esophageal cancer [Z80.0]) MD Aidan Zhou, [facility]          Anesthesia Type: MAC  Last vitals  BP   153/97 (01/11/18 1541)   Temp   98.2 °F (36.8 °C) (01/11/18 1541)   Pulse   77 (01/11/18 1541)   Resp   18 (01/11/18 1541)     SpO2   97 % (01/11/18 1541)     Post Anesthesia Care and Evaluation    Patient location during evaluation: bedside  Patient participation: complete - patient participated  Level of consciousness: awake  Pain score: 0  Pain management: adequate  Airway patency: patent  Anesthetic complications: No anesthetic complications  PONV Status: none  Cardiovascular status: acceptable  Respiratory status: acceptable

## 2018-01-15 LAB
LAB AP CASE REPORT: NORMAL
Lab: NORMAL
PATH REPORT.FINAL DX SPEC: NORMAL
PATH REPORT.GROSS SPEC: NORMAL

## 2018-02-01 ENCOUNTER — OFFICE VISIT (OUTPATIENT)
Dept: GASTROENTEROLOGY | Facility: CLINIC | Age: 48
End: 2018-02-01

## 2018-02-01 VITALS
SYSTOLIC BLOOD PRESSURE: 144 MMHG | HEART RATE: 99 BPM | DIASTOLIC BLOOD PRESSURE: 90 MMHG | WEIGHT: 274.2 LBS | BODY MASS INDEX: 37.14 KG/M2 | HEIGHT: 72 IN

## 2018-02-01 DIAGNOSIS — K22.2 STRICTURE AND STENOSIS OF ESOPHAGUS: ICD-10-CM

## 2018-02-01 DIAGNOSIS — K92.1 BLOOD IN STOOL: Primary | ICD-10-CM

## 2018-02-01 DIAGNOSIS — K29.00 ACUTE GASTRITIS WITHOUT HEMORRHAGE, UNSPECIFIED GASTRITIS TYPE: ICD-10-CM

## 2018-02-01 DIAGNOSIS — K21.9 GASTROESOPHAGEAL REFLUX DISEASE WITHOUT ESOPHAGITIS: ICD-10-CM

## 2018-02-01 PROCEDURE — 99213 OFFICE O/P EST LOW 20 MIN: CPT | Performed by: NURSE PRACTITIONER

## 2018-02-01 RX ORDER — PANTOPRAZOLE SODIUM 40 MG/1
40 TABLET, DELAYED RELEASE ORAL DAILY
Qty: 30 TABLET | Refills: 11 | Status: SHIPPED | OUTPATIENT
Start: 2018-02-01 | End: 2018-09-19

## 2018-02-01 NOTE — PROGRESS NOTES
Chief Complaint   Patient presents with   • EGD     results       Subjective    Venkatesh Grimes is a 47 y.o. male. he is being seen for follow up.   Heartburn   He complains of coughing and a sore throat. He reports no abdominal pain or no nausea. Pertinent negatives include no fatigue.   Difficulty Swallowing   This is a recurrent problem. The problem occurs daily. The problem has been waxing and waning. Associated symptoms include coughing and a sore throat. Pertinent negatives include no abdominal pain, arthralgias, chills, diaphoresis, fatigue, fever, nausea or vomiting.   47-year-old male presents to discussEGD results.  He was initially seen on 12/4/17 to discuss painful swallowing.  He reports symptoms are about the same since EGD.  Reports initially right after procedure he had some relief however still having some painful swallowing and describes constant sore throat.  He does state he feels like his 30s not quite as tight.  He has not been taking any proton pump inhibitors or medication for acid reflux.  He denies any nausea vomiting or choking episodes.  He would also like to discuss some recent blood in his stool.  States it occurs intermittently last few days and then improves.  Has never had a colonoscopy in the past.  Denies any family history of colorectal cancer.  His bowel habits are normal.  EGD was completed 1/11/18 noted stricture of the esophagus which was dilated to [address] [place].  Mildly severe esophagitis was found, gastritis was found duodenum was normal.  Antral biopsy noted reactive gastropathy, distal esophagus biopsy noted benign squamous epithelium.  Plan; have discussed with patient importance of avoiding gastric irritants recommend he continue Protonix daily and have recommended a colonoscopy due to blood in stool.  Patient declines to schedule colonoscopy at this time.  We will follow-up in one month, sooner if needed. If blood persists or worsens schedule colonoscopy at that time  or he may call me can schedule colonoscopy.      The following portions of the patient's history were reviewed and updated as appropriate:   Past Medical History:   Diagnosis Date   • CTS (carpal tunnel syndrome)    • Hyperlipidemia    • Other obesity    • Tobacco dependence syndrome      Past Surgical History:   Procedure Laterality Date   • CARPAL TUNNEL RELEASE     • COLONOSCOPY     • ECHO - CONVERTED  06/10/2016    Ef 55-60%.Impiared LV relaxation. [facility]   • ENDOSCOPY N/A 1/11/2018    Procedure: ESOPHAGOGASTRODUODENOSCOPY possible dilation;  Surgeon: Vijay Rodriguez MD;  Location: [facility] ENDOSCOPY;  Service:    • HAND SURGERY  11/06/2012    Excision. Osteochondroma & exostosis of right index finger of proximal phalanx   • ROTATOR CUFF REPAIR     • TONSILLECTOMY     • TONSILLECTOMY       Family History   Problem Relation Age of Onset   • Diabetes Mother    • Heart disease Mother      ischemic   • Hypertension Mother    • Heart failure Mother      congestive   • Hypertension Father    • Diabetes Father    • Stroke Father    • Cancer Father    • Heart disease Father    • Hypertension Sister    • Diabetes Sister    • Thyroid cancer Sister    • Cancer Sister    • Heart disease Brother    • Hypertension Brother    • Leukemia Other    • Cancer Paternal Grandmother        Current Outpatient Prescriptions   Medication Sig Dispense Refill   • pantoprazole (PROTONIX) 40 MG EC tablet Take 1 tablet by mouth Daily. 30 tablet 11     No current facility-administered medications for this visit.      Allergies   Allergen Reactions   • Iodine    • Shellfish-Derived Products      Social History     Social History   • Marital status:      Spouse name: N/A   • Number of children: N/A   • Years of education: N/A     Social History Main Topics   • Smoking status: Former Smoker     Packs/day: 1.00     Types: Cigarettes   • Smokeless tobacco: Current User     Types: Chew   • Alcohol use Yes   • Drug use: No   •  "Sexual activity: Yes     Other Topics Concern   • None     Social History Narrative       Review of Systems  Review of Systems   Constitutional: Negative for activity change, appetite change, chills, diaphoresis, fatigue, fever and unexpected weight change.   HENT: Positive for sore throat and trouble swallowing (painful swallowing).    Respiratory: Positive for cough. Negative for shortness of breath.    Gastrointestinal: Negative for abdominal distention, abdominal pain, anal bleeding, blood in stool, constipation, diarrhea, nausea, rectal pain and vomiting.   Musculoskeletal: Negative for arthralgias.   Skin: Negative for pallor.   Neurological: Negative for light-headedness.        /90 (BP Location: Right arm, Patient Position: Sitting, Cuff Size: Adult)  Pulse 99  Ht 182.9 cm (72.01\")  Wt 124 kg (274 lb 3.2 oz)  BMI 37.18 kg/m2    Objective    Physical Exam   Constitutional: He is oriented to person, place, and time. He appears well-developed and well-nourished. He is cooperative. No distress.   HENT:   Head: Normocephalic and atraumatic.   Neck: Normal range of motion. Neck supple. No thyromegaly present.   Cardiovascular: Normal rate, regular rhythm and normal heart sounds.    Pulmonary/Chest: Effort normal and breath sounds normal. He has no wheezes. He has no rhonchi. He has no rales.   Abdominal: Soft. Normal appearance and bowel sounds are normal. He exhibits no shifting dullness and no distension. There is no hepatosplenomegaly. There is no tenderness. There is no rigidity and no guarding. No hernia.   Lymphadenopathy:     He has no cervical adenopathy.   Neurological: He is alert and oriented to person, place, and time.   Skin: Skin is warm, dry and intact. No rash noted. No pallor.   Psychiatric: He has a normal mood and affect. His speech is normal.     Admission on 01/11/2018, Discharged on 01/11/2018   Component Date Value Ref Range Status   • Case Report 01/11/2018    Final                "     Value:Surgical Pathology Report                         Case: [id number]                                  Authorizing Provider:  Vijay Rodriguez MD         Collected:           01/11/2018 05:03 PM          Ordering Location:     [facility]             Received:            01/12/2018 07:49 AM                                 [place] [facility]                                                     Pathologist:           Zaheer Jose MD                                                            Specimens:   1) - Gastric, Antrum                                                                                2) - Esophagus, Distal                                                                    • Final Diagnosis 01/11/2018    Final                    Value:This result contains rich text formatting which cannot be displayed here.   • Gross Description 01/11/2018    Final                    Value:This result contains rich text formatting which cannot be displayed here.     Assessment/Plan      1. Blood in stool    2. Gastroesophageal reflux disease without esophagitis    3. Acute gastritis without hemorrhage, unspecified gastritis type    .       Orders placed during this encounter include:      * Surgery not found *    Review and/or summary of lab tests, radiology, procedures, medications. Review and summary of old records and obtaining of history. The risks and benefits of my recommendations, as well as other treatment options were discussed with the patient today. Questions were answered.    New Medications Ordered This Visit   Medications   • pantoprazole (PROTONIX) 40 MG EC tablet     Sig: Take 1 tablet by mouth Daily.     Dispense:  30 tablet     Refill:  11       Follow-up: Return in about 4 weeks (around 3/1/2018).          This document has been electronically signed by CARROLL Garcia on February 2, 2018 8:55 AM             Results for orders placed or performed during the hospital encounter of  01/11/18   Tissue Pathology Exam - Tissue, Gastric, Antrum   Result Value Ref Range    Case Report       Surgical Pathology Report                         Case: [id number]                                  Authorizing Provider:  Vijay Rodriguez MD         Collected:           01/11/2018 05:03 PM          Ordering Location:     [facility]             Received:            01/12/2018 07:49 AM                                 [place] [facility]                                                     Pathologist:           Zaheer Jose MD                                                            Specimens:   1) - Gastric, Antrum                                                                                2) - Esophagus, Distal                                                                     Final Diagnosis       1.  GASTRIC ANTRUM, BIOPSY:  REACTIVE GASTROPATHY.    2.  DISTAL ESOPHAGUS, BIOPSY:  BENIGN SQUAMOUS EPITHELIUM.      Gross Description       1.  Two tan fragments measure 0.6 x 0.4 x 0.2 cm together.  Totally submitted.      2.  One gray fragment measures 0.5 x 0.5 x 0.1 cm.  Totally submitted.        Embedded Images     Results for orders placed or performed in visit on 08/01/17   Lipid Panel With LDL / HDL Ratio   Result Value Ref Range    Total Cholesterol 227 (H) 0 - 199 mg/dL    Triglycerides 155 20 - 199 mg/dL    HDL Cholesterol 58 (L) 60 - 200 mg/dL    LDL Cholesterol  138 (H) 0 - 129 mg/dL    VLDL Cholesterol 31 mg/dL    LDL/HDL Ratio 2.38 0.00 - 3.55   CBC (No Diff)   Result Value Ref Range    WBC 6.97 3.20 - 9.80 10*3/mm3    RBC 5.10 4.37 - 5.74 10*6/mm3    Hemoglobin 15.7 13.7 - 17.3 g/dL    Hematocrit 45.5 39.0 - 49.0 %    MCV 89.2 80.0 - 98.0 fL    MCH 30.8 26.5 - 34.0 pg    MCHC 34.5 31.5 - 36.3 g/dL    RDW 12.4 11.5 - 14.5 %    RDW-SD 40.0 35.1 - 43.9 fl    MPV 10.8 8.0 - 12.0 fL    Platelets 318 150 - 450 10*3/mm3   TSH   Result Value Ref Range    TSH 3.480 0.460 - 4.680 mIU/mL   T4,  "Free   Result Value Ref Range    Free T4 1.09 0.78 - 2.19 ng/dL   Iron   Result Value Ref Range    Iron 71 49 - 181 mcg/dL   Hemoglobin A1c   Result Value Ref Range    Hemoglobin A1C 5.4 4 - 5.6 %   Vitamin B12   Result Value Ref Range    Vitamin B-12 517 239 - 931 pg/mL   Comprehensive Metabolic Panel   Result Value Ref Range    Glucose 86 60 - 100 mg/dL    BUN 17 7 - 21 mg/dL    Creatinine 0.99 0.70 - 1.30 mg/dL    Sodium 138 137 - 145 mmol/L    Potassium 3.9 3.5 - 5.1 mmol/L    Chloride 100 95 - 110 mmol/L    CO2 26.0 22.0 - 31.0 mmol/L    Calcium 10.1 8.4 - 10.2 mg/dL    Total Protein 7.9 6.3 - 8.6 g/dL    Albumin 5.10 (H) 3.40 - 4.80 g/dL    ALT (SGPT) 44 21 - 72 U/L    AST (SGOT) 56 17 - 59 U/L    Alkaline Phosphatase 77 38 - 126 U/L    Total Bilirubin 0.6 0.2 - 1.3 mg/dL    eGFR Non  Amer 82 63 - 147 mL/min/1.73    Globulin 2.8 2.3 - 3.5 gm/dL    A/G Ratio 1.8 1.1 - 1.8 g/dL    BUN/Creatinine Ratio 17.2 7.0 - 25.0    Anion Gap 12.0 5.0 - 15.0 mmol/L   Results for orders placed or performed during the hospital encounter of 02/02/17   POCT Influenza A/B   Result Value Ref Range    Rapid Influenza A Ag positive     Rapid Influenza B Ag negative     Internal Control Passed Passed    Lot Number [id number]     Expiration Date 6-30-18    Results for orders placed or performed in visit on 11/06/12   Converted Surgical Pathology   Result Value Ref Range    Spec Descr 1 SPECIMEN(S): A OSTEOCHRONDROMA     Preoperative Diagnosis         PREOPERATIVE DIAGNOSIS:  Mass, index finger, right      Postoperative Diagnosis         POSTOPERATIVE DIAGNOSIS:  Mass, right index finger      Gross Description         GROSS DESCRIPTION:  The specimen is labeled \"mass right index finger osteochondroma\".  Multiple bony fragments measure 2.5 x 2.0 x 0.5 cm together.  Totally  submitted for decalcification.      Final Diagnosis         FINAL DIAGNOSIS:  BONE, RIGHT INDEX FINGER:       MATURE BONE, CONSISTENT WITH OSTEOCHONDROMA.      " CONVERTED (HISTORICAL) FINAL PATHOLOGIST       Diagnostician:  NAOMI DEL ROSARIO M.D.  Pathologist  Electronically Signed 11/13/2012      Diagnosis Code   DIAGNOSIS CODE:  3      Results for orders placed or performed in visit on 11/22/11   Converted Surgical Pathology   Result Value Ref Range    Spec Descr 1 SPECIMEN(S): A SHAVINGS RIGHT SHOULDER     Preoperative Diagnosis         PREOPERATIVE DIAGNOSIS:  Right shoulder arthroscopy      Postoperative Diagnosis         POSTOPERATIVE DIAGNOSIS:  Right shoulder arthroscopy      Gross Description         GROSS DESCRIPTION:  The specimen consists of fragments of arthroscopy shavings from the  right knee measuring up to 0.5 cm in greatest dimension in aggregate  measuring 1.0 x 1.0 x 1.0 cm.  Representative sections are embedded.      Final Diagnosis         FINAL DIAGNOSIS:  FRAGMENTS OF BONE, SKELETAL MUSCLE AND OTHER SOFT TISSUES, RIGHT       SHOULDER.      CONVERTED (HISTORICAL) FINAL PATHOLOGIST       Diagnostician:  TOYA FLOYD M.D.  Pathologist  Electronically Signed 11/23/2011      Diagnosis Code   DIAGNOSIS CODE:  3

## 2018-09-19 ENCOUNTER — OFFICE VISIT (OUTPATIENT)
Dept: GASTROENTEROLOGY | Facility: CLINIC | Age: 48
End: 2018-09-19

## 2018-09-19 VITALS
WEIGHT: 269.4 LBS | HEIGHT: 72 IN | BODY MASS INDEX: 36.49 KG/M2 | OXYGEN SATURATION: 99 % | DIASTOLIC BLOOD PRESSURE: 98 MMHG | SYSTOLIC BLOOD PRESSURE: 162 MMHG | HEART RATE: 92 BPM

## 2018-09-19 DIAGNOSIS — K59.00 CONSTIPATION, UNSPECIFIED CONSTIPATION TYPE: ICD-10-CM

## 2018-09-19 DIAGNOSIS — K92.1 BLOOD IN STOOL: Primary | ICD-10-CM

## 2018-09-19 DIAGNOSIS — R19.5 CHANGE IN STOOL CALIBER: ICD-10-CM

## 2018-09-19 DIAGNOSIS — R10.84 GENERALIZED ABDOMINAL PAIN: ICD-10-CM

## 2018-09-19 PROCEDURE — 99214 OFFICE O/P EST MOD 30 MIN: CPT | Performed by: NURSE PRACTITIONER

## 2018-09-19 RX ORDER — SODIUM, POTASSIUM,MAG SULFATES 17.5-3.13G
1 SOLUTION, RECONSTITUTED, ORAL ORAL EVERY 12 HOURS
Qty: 2 BOTTLE | Refills: 0 | Status: ON HOLD | OUTPATIENT
Start: 2018-09-19 | End: 2018-10-03

## 2018-09-19 RX ORDER — DEXTROSE AND SODIUM CHLORIDE 5; .45 G/100ML; G/100ML
30 INJECTION, SOLUTION INTRAVENOUS CONTINUOUS PRN
Status: CANCELLED | OUTPATIENT
Start: 2018-09-19

## 2018-09-19 NOTE — PATIENT INSTRUCTIONS
Colonoscopy, Adult  A colonoscopy is an exam to look at the entire large intestine. During the exam, a lubricated, bendable tube is inserted into the anus and then passed into the rectum, colon, and other parts of the large intestine.  A colonoscopy is often done as a part of normal colorectal screening or in response to certain symptoms, such as anemia, persistent diarrhea, abdominal pain, and blood in the stool. The exam can help screen for and diagnose medical problems, including:  · Tumors.  · Polyps.  · Inflammation.  · Areas of bleeding.    Tell a health care provider about:  · Any allergies you have.  · All medicines you are taking, including vitamins, herbs, eye drops, creams, and over-the-counter medicines.  · Any problems you or family members have had with anesthetic medicines.  · Any blood disorders you have.  · Any surgeries you have had.  · Any medical conditions you have.  · Any problems you have had passing stool.  What are the risks?  Generally, this is a safe procedure. However, problems may occur, including:  · Bleeding.  · A tear in the intestine.  · A reaction to medicines given during the exam.  · Infection (rare).    What happens before the procedure?  Eating and drinking restrictions  Follow instructions from your health care provider about eating and drinking, which may include:  · A few days before the procedure - follow a low-fiber diet. Avoid nuts, seeds, dried fruit, raw fruits, and vegetables.  · 1-3 days before the procedure - follow a clear liquid diet. Drink only clear liquids, such as clear broth or bouillon, black coffee or tea, clear juice, clear soft drinks or sports drinks, gelatin dessert, and popsicles. Avoid any liquids that contain red or purple dye.  · On the day of the procedure - do not eat or drink anything during the 2 hours before the procedure, or within the time period that your health care provider recommends.    Bowel prep  If you were prescribed an oral bowel prep  to clean out your colon:  · Take it as told by your health care provider. Starting the day before your procedure, you will need to drink a large amount of medicated liquid. The liquid will cause you to have multiple loose stools until your stool is almost clear or light green.  · If your skin or anus gets irritated from diarrhea, you may use these to relieve the irritation:  ? Medicated wipes, such as adult wet wipes with aloe and vitamin E.  ? A skin soothing-product like petroleum jelly.  · If you vomit while drinking the bowel prep, take a break for up to 60 minutes and then begin the bowel prep again. If vomiting continues and you cannot take the bowel prep without vomiting, call your health care provider.    General instructions  · Ask your health care provider about changing or stopping your regular medicines. This is especially important if you are taking diabetes medicines or blood thinners.  · Plan to have someone take you home from the hospital or clinic.  What happens during the procedure?  · An IV tube may be inserted into one of your veins.  · You will be given medicine to help you relax (sedative).  · To reduce your risk of infection:  ? Your health care team will wash or sanitize their hands.  ? Your anal area will be washed with soap.  · You will be asked to lie on your side with your knees bent.  · Your health care provider will lubricate a long, thin, flexible tube. The tube will have a camera and a light on the end.  · The tube will be inserted into your anus.  · The tube will be gently eased through your rectum and colon.  · Air will be delivered into your colon to keep it open. You may feel some pressure or cramping.  · The camera will be used to take images during the procedure.  · A small tissue sample may be removed from your body to be examined under a microscope (biopsy). If any potential problems are found, the tissue will be sent to a lab for testing.  · If small polyps are found, your  health care provider may remove them and have them checked for cancer cells.  · The tube that was inserted into your anus will be slowly removed.  The procedure may vary among health care providers and hospitals.  What happens after the procedure?  · Your blood pressure, heart rate, breathing rate, and blood oxygen level will be monitored until the medicines you were given have worn off.  · Do not drive for 24 hours after the exam.  · You may have a small amount of blood in your stool.  · You may pass gas and have mild abdominal cramping or bloating due to the air that was used to inflate your colon during the exam.  · It is up to you to get the results of your procedure. Ask your health care provider, or the department performing the procedure, when your results will be ready.  This information is not intended to replace advice given to you by your health care provider. Make sure you discuss any questions you have with your health care provider.  Document Released: 12/15/2001 Document Revised: 10/18/2017 Document Reviewed: 02/28/2017  [employer] Patient Education © 2018 [employer]

## 2018-09-19 NOTE — PROGRESS NOTES
Chief Complaint   Patient presents with   • Black or Bloody Stool       Subjective    Venkatesh Grimes is a 47 y.o. male. he is here today for follow-up.    47-year-old male presents for follow-up.  At last visit he was having intermittent rectal bleeding however states it has recently worsened and he is also having generalized abdominal pain worse in the left left side.  States he lost several days of constipation that have liquid stool with bright red blood on top of stool and mixed in with the stool.  Rectal Bleeding   This is a recurrent problem. The current episode started more than 1 month ago. The problem occurs intermittently. The problem has been waxing and waning. Associated symptoms include abdominal pain (after eating ), a change in bowel habit and fatigue. Pertinent negatives include no anorexia, arthralgias, chest pain, chills, congestion, coughing, diaphoresis, fever, headaches, joint swelling, myalgias, nausea, neck pain, numbness, sore throat or vomiting.   Plan; we'll schedule patient for CT due to generalized abdominal pain changes in bowel habits and blood in stool.  Schedule patient for colonoscopy.  Have discussed starting patient on MiraLAX daily if CT does not show acute colitis/diverticulitis or other abnormalities. Follow-up after test return to office sooner needed.         The following portions of the patient's history were reviewed and updated as appropriate:   Past Medical History:   Diagnosis Date   • CTS (carpal tunnel syndrome)    • Hyperlipidemia    • Other obesity    • Tobacco dependence syndrome      Past Surgical History:   Procedure Laterality Date   • CARPAL TUNNEL RELEASE     • COLONOSCOPY     • ECHO - CONVERTED  06/10/2016    Ef 55-60%.Impiared LV relaxation. [facility]   • ENDOSCOPY N/A 1/11/2018    Procedure: ESOPHAGOGASTRODUODENOSCOPY possible dilation;  Surgeon: Vijay Rodriguez MD;  Location: [facility] ENDOSCOPY;  Service:    • HAND SURGERY  11/06/2012     Excision. Osteochondroma & exostosis of right index finger of proximal phalanx   • ROTATOR CUFF REPAIR     • TONSILLECTOMY     • TONSILLECTOMY       Family History   Problem Relation Age of Onset   • Diabetes Mother    • Heart disease Mother         ischemic   • Hypertension Mother    • Heart failure Mother         congestive   • Hypertension Father    • Diabetes Father    • Stroke Father    • Cancer Father    • Heart disease Father    • Hypertension Sister    • Diabetes Sister    • Thyroid cancer Sister    • Cancer Sister    • Heart disease Brother    • Hypertension Brother    • Leukemia Other    • Cancer Paternal Grandmother        Current Outpatient Prescriptions   Medication Sig Dispense Refill   • sodium-potassium-magnesium sulfates (SUPREP BOWEL PREP KIT) 17.5-3.13-1.6 GM/180ML solution oral solution Take 1 bottle by mouth Every 12 (Twelve) Hours. 2 bottle 0     No current facility-administered medications for this visit.      Allergies   Allergen Reactions   • Shellfish-Derived Products      Social History     Social History   • Marital status:      Social History Main Topics   • Smoking status: Former Smoker     Packs/day: 1.00     Types: Cigarettes   • Smokeless tobacco: Current User     Types: Chew   • Alcohol use Yes   • Drug use: No   • Sexual activity: Yes     Other Topics Concern   • Not on file       Review of Systems  Review of Systems   Constitutional: Positive for fatigue. Negative for activity change, appetite change, chills, diaphoresis, fever and unexpected weight change.   HENT: Negative for congestion, sore throat and trouble swallowing.    Respiratory: Negative for cough and shortness of breath.    Cardiovascular: Negative for chest pain.   Gastrointestinal: Positive for abdominal distention (he reports bloating after eating ), abdominal pain (after eating ), blood in stool, change in bowel habit, constipation and hematochezia. Negative for anal bleeding, anorexia, diarrhea, nausea,  "rectal pain and vomiting.   Musculoskeletal: Negative for arthralgias, joint swelling, myalgias and neck pain.   Skin: Negative for pallor.   Neurological: Negative for light-headedness, numbness and headaches.        /98   Pulse 92   Ht 182.9 cm (72\")   Wt 122 kg (269 lb 6.4 oz)   SpO2 99%   BMI 36.54 kg/m²     Objective    Physical Exam   Constitutional: He is oriented to person, place, and time. He appears well-developed and well-nourished. He is cooperative. No distress.   HENT:   Head: Normocephalic and atraumatic.   Neck: Normal range of motion. Neck supple. No thyromegaly present.   Cardiovascular: Normal rate, regular rhythm and normal heart sounds.    Pulmonary/Chest: Effort normal and breath sounds normal. He has no wheezes. He has no rhonchi. He has no rales.   Abdominal: Soft. Normal appearance and bowel sounds are normal. He exhibits no shifting dullness, no distension, no fluid wave and no ascites. There is no hepatosplenomegaly. There is generalized tenderness (worsened on left side but very tender all over ). There is no rigidity and no guarding. No hernia.   Lymphadenopathy:     He has no cervical adenopathy.   Neurological: He is alert and oriented to person, place, and time.   Skin: Skin is warm, dry and intact. No rash noted. No pallor.   Psychiatric: He has a normal mood and affect. His speech is normal.     Admission on 01/11/2018, Discharged on 01/11/2018   Component Date Value Ref Range Status   • Case Report 01/11/2018    Final                    Value:Surgical Pathology Report                         Case: [id number]                                  Authorizing Provider:  Vjiay Rodriguez MD         Collected:           01/11/2018 05:03 PM          Ordering Location:     [facility]             Received:            01/12/2018 07:49 AM                                 [facility]                                                     Pathologist:           Zaheer" MD Damon                                                            Specimens:   1) - Gastric, Antrum                                                                                2) - Esophagus, Distal                                                                    • Final Diagnosis 01/11/2018    Final                    Value:This result contains rich text formatting which cannot be displayed here.   • Gross Description 01/11/2018    Final                    Value:This result contains rich text formatting which cannot be displayed here.     Assessment/Plan      1. Blood in stool    2. Constipation, unspecified constipation type    3. Change in stool caliber    4. Generalized abdominal pain    .       Orders placed during this encounter include:  Orders Placed This Encounter   Procedures   • CT Abdomen Pelvis With Contrast     Order Specific Question:   Will Oral Contrast be needed for this procedure?     Answer:   Yes   • Follow Anesthesia Guidelines / Standing Orders     Standing Status:   Future       COLONOSCOPY (N/A)    Review and/or summary of lab tests, radiology, procedures, medications. Review and summary of old records and obtaining of history. The risks and benefits of my recommendations, as well as other treatment options were discussed with the patient today. Questions were answered.    New Medications Ordered This Visit   Medications   • sodium-potassium-magnesium sulfates (SUPREP BOWEL PREP KIT) 17.5-3.13-1.6 GM/180ML solution oral solution     Sig: Take 1 bottle by mouth Every 12 (Twelve) Hours.     Dispense:  2 bottle     Refill:  0       Follow-up: Return in about 4 weeks (around 10/17/2018).          This document has been electronically signed by CARROLL Garcia on September 19, 2018 4:47 PM             Results for orders placed or performed during the hospital encounter of 01/11/18   Tissue Pathology Exam - Tissue, Gastric, Antrum   Result Value Ref Range    Case Report       Surgical  Pathology Report                         Case: [id number]                                  Authorizing Provider:  Vijay Rodriguez MD         Collected:           01/11/2018 05:03 PM          Ordering Location:     [facility]             Received:            01/12/2018 07:49 AM                                 [facility]                                                     Pathologist:           Zaheer Jose MD                                                            Specimens:   1) - Gastric, Antrum                                                                                2) - Esophagus, Distal                                                                     Final Diagnosis       1.  GASTRIC ANTRUM, BIOPSY:  REACTIVE GASTROPATHY.    2.  DISTAL ESOPHAGUS, BIOPSY:  BENIGN SQUAMOUS EPITHELIUM.      Gross Description       1.  Two tan fragments measure 0.6 x 0.4 x 0.2 cm together.  Totally submitted.      2.  One gray fragment measures 0.5 x 0.5 x 0.1 cm.  Totally submitted.        Embedded Images     Results for orders placed or performed in visit on 08/01/17   Lipid Panel With LDL / HDL Ratio   Result Value Ref Range    Total Cholesterol 227 (H) 0 - 199 mg/dL    Triglycerides 155 20 - 199 mg/dL    HDL Cholesterol 58 (L) 60 - 200 mg/dL    LDL Cholesterol  138 (H) 0 - 129 mg/dL    VLDL Cholesterol 31 mg/dL    LDL/HDL Ratio 2.38 0.00 - 3.55   CBC (No Diff)   Result Value Ref Range    WBC 6.97 3.20 - 9.80 10*3/mm3    RBC 5.10 4.37 - 5.74 10*6/mm3    Hemoglobin 15.7 13.7 - 17.3 g/dL    Hematocrit 45.5 39.0 - 49.0 %    MCV 89.2 80.0 - 98.0 fL    MCH 30.8 26.5 - 34.0 pg    MCHC 34.5 31.5 - 36.3 g/dL    RDW 12.4 11.5 - 14.5 %    RDW-SD 40.0 35.1 - 43.9 fl    MPV 10.8 8.0 - 12.0 fL    Platelets 318 150 - 450 10*3/mm3   TSH   Result Value Ref Range    TSH 3.480 0.460 - 4.680 mIU/mL   T4, Free   Result Value Ref Range    Free T4 1.09 0.78 - 2.19 ng/dL   Iron   Result Value Ref Range    Iron 71 49 -  "181 mcg/dL   Hemoglobin A1c   Result Value Ref Range    Hemoglobin A1C 5.4 4 - 5.6 %   Vitamin B12   Result Value Ref Range    Vitamin B-12 517 239 - 931 pg/mL   Comprehensive Metabolic Panel   Result Value Ref Range    Glucose 86 60 - 100 mg/dL    BUN 17 7 - 21 mg/dL    Creatinine 0.99 0.70 - 1.30 mg/dL    Sodium 138 137 - 145 mmol/L    Potassium 3.9 3.5 - 5.1 mmol/L    Chloride 100 95 - 110 mmol/L    CO2 26.0 22.0 - 31.0 mmol/L    Calcium 10.1 8.4 - 10.2 mg/dL    Total Protein 7.9 6.3 - 8.6 g/dL    Albumin 5.10 (H) 3.40 - 4.80 g/dL    ALT (SGPT) 44 21 - 72 U/L    AST (SGOT) 56 17 - 59 U/L    Alkaline Phosphatase 77 38 - 126 U/L    Total Bilirubin 0.6 0.2 - 1.3 mg/dL    eGFR Non  Amer 82 63 - 147 mL/min/1.73    Globulin 2.8 2.3 - 3.5 gm/dL    A/G Ratio 1.8 1.1 - 1.8 g/dL    BUN/Creatinine Ratio 17.2 7.0 - 25.0    Anion Gap 12.0 5.0 - 15.0 mmol/L   Results for orders placed or performed during the hospital encounter of 02/02/17   POCT Influenza A/B   Result Value Ref Range    Rapid Influenza A Ag positive     Rapid Influenza B Ag negative     Internal Control Passed Passed    Lot Number [id number]     Expiration Date 6-30-18    Results for orders placed or performed in visit on 11/06/12   Converted Surgical Pathology   Result Value Ref Range    Spec Descr 1 SPECIMEN(S): A OSTEOCHRONDROMA     Preoperative Diagnosis         PREOPERATIVE DIAGNOSIS:  Mass, index finger, right      Postoperative Diagnosis         POSTOPERATIVE DIAGNOSIS:  Mass, right index finger      Gross Description         GROSS DESCRIPTION:  The specimen is labeled \"mass right index finger osteochondroma\".  Multiple bony fragments measure 2.5 x 2.0 x 0.5 cm together.  Totally  submitted for decalcification.      Final Diagnosis         FINAL DIAGNOSIS:  BONE, RIGHT INDEX FINGER:       MATURE BONE, CONSISTENT WITH OSTEOCHONDROMA.      CONVERTED (HISTORICAL) FINAL PATHOLOGIST       Diagnostician:  NAOMI DEL ROSARIO " M.D.  Pathologist  Electronically Signed 11/13/2012      Diagnosis Code   DIAGNOSIS CODE:  3      Results for orders placed or performed in visit on 11/22/11   Converted Surgical Pathology   Result Value Ref Range    Spec Descr 1 SPECIMEN(S): A SHAVINGS RIGHT SHOULDER     Preoperative Diagnosis         PREOPERATIVE DIAGNOSIS:  Right shoulder arthroscopy      Postoperative Diagnosis         POSTOPERATIVE DIAGNOSIS:  Right shoulder arthroscopy      Gross Description         GROSS DESCRIPTION:  The specimen consists of fragments of arthroscopy shavings from the  right knee measuring up to 0.5 cm in greatest dimension in aggregate  measuring 1.0 x 1.0 x 1.0 cm.  Representative sections are embedded.      Final Diagnosis         FINAL DIAGNOSIS:  FRAGMENTS OF BONE, SKELETAL MUSCLE AND OTHER SOFT TISSUES, RIGHT       SHOULDER.      CONVERTED (HISTORICAL) FINAL PATHOLOGIST       Diagnostician:  TOYA FLOYD M.D.  Pathologist  Electronically Signed 11/23/2011      Diagnosis Code   DIAGNOSIS CODE:  3

## 2018-09-21 ENCOUNTER — HOSPITAL ENCOUNTER (OUTPATIENT)
Dept: CT IMAGING | Facility: HOSPITAL | Age: 48
Discharge: HOME | End: 2018-09-21
Admitting: NURSE PRACTITIONER

## 2018-09-21 PROCEDURE — 25010000002 IOPAMIDOL 61 % SOLUTION: Performed by: NURSE PRACTITIONER

## 2018-09-21 PROCEDURE — 74177 CT ABD & PELVIS W/CONTRAST: CPT

## 2018-09-21 RX ADMIN — IOPAMIDOL 90 ML: 612 INJECTION, SOLUTION INTRAVENOUS at 19:03

## 2018-09-24 ENCOUNTER — TELEPHONE (OUTPATIENT)
Dept: GASTROENTEROLOGY | Facility: CLINIC | Age: 48
End: 2018-09-24

## 2018-09-24 NOTE — TELEPHONE ENCOUNTER
Contacted patient to give him results of Ct scan per Cyndie HODGES. Patient voiced understanding .

## 2018-10-02 RX ORDER — DEXTROSE AND SODIUM CHLORIDE 5; .45 G/100ML; G/100ML
30 INJECTION, SOLUTION INTRAVENOUS CONTINUOUS PRN
Status: DISCONTINUED | OUTPATIENT
Start: 2018-10-02 | End: 2018-10-02 | Stop reason: SDUPTHER

## 2018-10-03 ENCOUNTER — ANESTHESIA EVENT (OUTPATIENT)
Dept: GASTROENTEROLOGY | Facility: HOSPITAL | Age: 48
End: 2018-10-03

## 2018-10-03 ENCOUNTER — HOSPITAL ENCOUNTER (OUTPATIENT)
Facility: HOSPITAL | Age: 48
Setting detail: HOSPITAL OUTPATIENT SURGERY
Discharge: HOME | End: 2018-10-03
Attending: INTERNAL MEDICINE | Admitting: INTERNAL MEDICINE

## 2018-10-03 ENCOUNTER — ANESTHESIA (OUTPATIENT)
Dept: GASTROENTEROLOGY | Facility: HOSPITAL | Age: 48
End: 2018-10-03

## 2018-10-03 VITALS
SYSTOLIC BLOOD PRESSURE: 115 MMHG | WEIGHT: 261.69 LBS | HEIGHT: 72 IN | OXYGEN SATURATION: 96 % | DIASTOLIC BLOOD PRESSURE: 91 MMHG | RESPIRATION RATE: 18 BRPM | BODY MASS INDEX: 35.44 KG/M2 | HEART RATE: 75 BPM | TEMPERATURE: 97.4 F

## 2018-10-03 DIAGNOSIS — R19.5 CHANGE IN STOOL CALIBER: ICD-10-CM

## 2018-10-03 DIAGNOSIS — K92.1 BLOOD IN STOOL: ICD-10-CM

## 2018-10-03 DIAGNOSIS — R10.84 GENERALIZED ABDOMINAL PAIN: ICD-10-CM

## 2018-10-03 PROCEDURE — 45380 COLONOSCOPY AND BIOPSY: CPT | Performed by: INTERNAL MEDICINE

## 2018-10-03 PROCEDURE — 88305 TISSUE EXAM BY PATHOLOGIST: CPT | Performed by: PATHOLOGY

## 2018-10-03 PROCEDURE — 25010000002 PROPOFOL 10 MG/ML EMULSION: Performed by: NURSE ANESTHETIST, CERTIFIED REGISTERED

## 2018-10-03 PROCEDURE — 88305 TISSUE EXAM BY PATHOLOGIST: CPT | Performed by: INTERNAL MEDICINE

## 2018-10-03 RX ORDER — LIDOCAINE HYDROCHLORIDE 20 MG/ML
INJECTION, SOLUTION INFILTRATION; PERINEURAL AS NEEDED
Status: DISCONTINUED | OUTPATIENT
Start: 2018-10-03 | End: 2018-10-03 | Stop reason: SURG

## 2018-10-03 RX ORDER — PROPOFOL 10 MG/ML
VIAL (ML) INTRAVENOUS AS NEEDED
Status: DISCONTINUED | OUTPATIENT
Start: 2018-10-03 | End: 2018-10-03 | Stop reason: SURG

## 2018-10-03 RX ORDER — DEXTROSE AND SODIUM CHLORIDE 5; .45 G/100ML; G/100ML
20 INJECTION, SOLUTION INTRAVENOUS CONTINUOUS
Status: DISCONTINUED | OUTPATIENT
Start: 2018-10-03 | End: 2018-10-03 | Stop reason: HOSPADM

## 2018-10-03 RX ADMIN — PROPOFOL 80 MG: 10 INJECTION, EMULSION INTRAVENOUS at 15:19

## 2018-10-03 RX ADMIN — PROPOFOL 20 MG: 10 INJECTION, EMULSION INTRAVENOUS at 15:21

## 2018-10-03 RX ADMIN — PROPOFOL 20 MG: 10 INJECTION, EMULSION INTRAVENOUS at 15:23

## 2018-10-03 RX ADMIN — DEXTROSE AND SODIUM CHLORIDE 20 ML/HR: 5; 450 INJECTION, SOLUTION INTRAVENOUS at 15:01

## 2018-10-03 RX ADMIN — LIDOCAINE HYDROCHLORIDE 50 MG: 20 INJECTION, SOLUTION INFILTRATION; PERINEURAL at 15:19

## 2018-10-03 NOTE — ANESTHESIA POSTPROCEDURE EVALUATION
Patient: Venkatesh Grimes    Procedure Summary     Date:  10/03/18 Room / Location:  [facility] ENDOSCOPY 1 / [facility] ENDOSCOPY    Anesthesia Start:  1510 Anesthesia Stop:  1532    Procedure:  COLONOSCOPY (N/A ) Diagnosis:       Blood in stool      Generalized abdominal pain      Change in stool caliber      (Blood in stool [K92.1])      (Generalized abdominal pain [R10.84])      (Change in stool caliber [R19.4])    Surgeon:  Vijay Rodriguez MD Provider:  Aidan Cat CRNA    Anesthesia Type:  MAC ASA Status:  3          Anesthesia Type: MAC  Last vitals  BP   140/99 (10/03/18 1450)   Temp   97.2 °F (36.2 °C) (10/03/18 1450)   Pulse   78 (10/03/18 1450)   Resp   18 (10/03/18 1450)     SpO2   100 % (10/03/18 1450)     Post Anesthesia Care and Evaluation    Patient location during evaluation: bedside  Patient participation: waiting for patient participation  Level of consciousness: responsive to physical stimuli  Pain score: 0  Pain management: adequate  Airway patency: patent  Anesthetic complications: No anesthetic complications  PONV Status: none  Cardiovascular status: acceptable  Respiratory status: acceptable  Hydration status: acceptable

## 2018-10-03 NOTE — ANESTHESIA PREPROCEDURE EVALUATION
Anesthesia Evaluation     Patient summary reviewed and Nursing notes reviewed   NPO Solid Status: > 8 hours  NPO Liquid Status: > 8 hours           Airway   Mallampati: III  TM distance: >3 FB  Neck ROM: full  Possible difficult intubation  Dental - normal exam     Pulmonary - normal exam   Cardiovascular - normal exam    (+) hyperlipidemia,       Neuro/Psych  (+) numbness,     GI/Hepatic/Renal/Endo    (+) obesity, morbid obesity, GI bleeding,     Musculoskeletal     Abdominal    Substance History      OB/GYN          Other                        Anesthesia Plan    ASA 3     MAC     intravenous induction   Anesthetic plan, all risks, benefits, and alternatives have been provided, discussed and informed consent has been obtained with: patient.    Plan discussed with CRNA.

## 2018-10-03 NOTE — H&P (VIEW-ONLY)
Chief Complaint   Patient presents with   • Black or Bloody Stool       Subjective    Venkatesh Grimes is a 47 y.o. male. he is here today for follow-up.    47-year-old male presents for follow-up.  At last visit he was having intermittent rectal bleeding however states it has recently worsened and he is also having generalized abdominal pain worse in the left left side.  States he lost several days of constipation that have liquid stool with bright red blood on top of stool and mixed in with the stool.  Rectal Bleeding   This is a recurrent problem. The current episode started more than 1 month ago. The problem occurs intermittently. The problem has been waxing and waning. Associated symptoms include abdominal pain (after eating ), a change in bowel habit and fatigue. Pertinent negatives include no anorexia, arthralgias, chest pain, chills, congestion, coughing, diaphoresis, fever, headaches, joint swelling, myalgias, nausea, neck pain, numbness, sore throat or vomiting.   Plan; we'll schedule patient for CT due to generalized abdominal pain changes in bowel habits and blood in stool.  Schedule patient for colonoscopy.  Have discussed starting patient on MiraLAX daily if CT does not show acute colitis/diverticulitis or other abnormalities. Follow-up after test return to office sooner needed.         The following portions of the patient's history were reviewed and updated as appropriate:   Past Medical History:   Diagnosis Date   • CTS (carpal tunnel syndrome)    • Hyperlipidemia    • Other obesity    • Tobacco dependence syndrome      Past Surgical History:   Procedure Laterality Date   • CARPAL TUNNEL RELEASE     • COLONOSCOPY     • ECHO - CONVERTED  06/10/2016    Ef 55-60%.Impiared LV relaxation. [facility]   • ENDOSCOPY N/A 1/11/2018    Procedure: ESOPHAGOGASTRODUODENOSCOPY possible dilation;  Surgeon: Vijay Rodriguez MD;  Location: [facility] ENDOSCOPY;  Service:    • HAND SURGERY  11/06/2012     Excision. Osteochondroma & exostosis of right index finger of proximal phalanx   • ROTATOR CUFF REPAIR     • TONSILLECTOMY     • TONSILLECTOMY       Family History   Problem Relation Age of Onset   • Diabetes Mother    • Heart disease Mother         ischemic   • Hypertension Mother    • Heart failure Mother         congestive   • Hypertension Father    • Diabetes Father    • Stroke Father    • Cancer Father    • Heart disease Father    • Hypertension Sister    • Diabetes Sister    • Thyroid cancer Sister    • Cancer Sister    • Heart disease Brother    • Hypertension Brother    • Leukemia Other    • Cancer Paternal Grandmother        Current Outpatient Prescriptions   Medication Sig Dispense Refill   • sodium-potassium-magnesium sulfates (SUPREP BOWEL PREP KIT) 17.5-3.13-1.6 GM/180ML solution oral solution Take 1 bottle by mouth Every 12 (Twelve) Hours. 2 bottle 0     No current facility-administered medications for this visit.      Allergies   Allergen Reactions   • Shellfish-Derived Products      Social History     Social History   • Marital status:      Social History Main Topics   • Smoking status: Former Smoker     Packs/day: 1.00     Types: Cigarettes   • Smokeless tobacco: Current User     Types: Chew   • Alcohol use Yes   • Drug use: No   • Sexual activity: Yes     Other Topics Concern   • Not on file       Review of Systems  Review of Systems   Constitutional: Positive for fatigue. Negative for activity change, appetite change, chills, diaphoresis, fever and unexpected weight change.   HENT: Negative for congestion, sore throat and trouble swallowing.    Respiratory: Negative for cough and shortness of breath.    Cardiovascular: Negative for chest pain.   Gastrointestinal: Positive for abdominal distention (he reports bloating after eating ), abdominal pain (after eating ), blood in stool, change in bowel habit, constipation and hematochezia. Negative for anal bleeding, anorexia, diarrhea, nausea,  "rectal pain and vomiting.   Musculoskeletal: Negative for arthralgias, joint swelling, myalgias and neck pain.   Skin: Negative for pallor.   Neurological: Negative for light-headedness, numbness and headaches.        /98   Pulse 92   Ht 182.9 cm (72\")   Wt 122 kg (269 lb 6.4 oz)   SpO2 99%   BMI 36.54 kg/m²     Objective    Physical Exam   Constitutional: He is oriented to person, place, and time. He appears well-developed and well-nourished. He is cooperative. No distress.   HENT:   Head: Normocephalic and atraumatic.   Neck: Normal range of motion. Neck supple. No thyromegaly present.   Cardiovascular: Normal rate, regular rhythm and normal heart sounds.    Pulmonary/Chest: Effort normal and breath sounds normal. He has no wheezes. He has no rhonchi. He has no rales.   Abdominal: Soft. Normal appearance and bowel sounds are normal. He exhibits no shifting dullness, no distension, no fluid wave and no ascites. There is no hepatosplenomegaly. There is generalized tenderness (worsened on left side but very tender all over ). There is no rigidity and no guarding. No hernia.   Lymphadenopathy:     He has no cervical adenopathy.   Neurological: He is alert and oriented to person, place, and time.   Skin: Skin is warm, dry and intact. No rash noted. No pallor.   Psychiatric: He has a normal mood and affect. His speech is normal.     Admission on 01/11/2018, Discharged on 01/11/2018   Component Date Value Ref Range Status   • Case Report 01/11/2018    Final                    Value:Surgical Pathology Report                         Case: [id number]                                  Authorizing Provider:  Vijay Rodriguez MD         Collected:           01/11/2018 05:03 PM          Ordering Location:     [facility]             Received:            01/12/2018 07:49 AM                                 [facility]                                                     Pathologist:           Zaheer" MD Damon                                                            Specimens:   1) - Gastric, Antrum                                                                                2) - Esophagus, Distal                                                                    • Final Diagnosis 01/11/2018    Final                    Value:This result contains rich text formatting which cannot be displayed here.   • Gross Description 01/11/2018    Final                    Value:This result contains rich text formatting which cannot be displayed here.     Assessment/Plan      1. Blood in stool    2. Constipation, unspecified constipation type    3. Change in stool caliber    4. Generalized abdominal pain    .       Orders placed during this encounter include:  Orders Placed This Encounter   Procedures   • CT Abdomen Pelvis With Contrast     Order Specific Question:   Will Oral Contrast be needed for this procedure?     Answer:   Yes   • Follow Anesthesia Guidelines / Standing Orders     Standing Status:   Future       COLONOSCOPY (N/A)    Review and/or summary of lab tests, radiology, procedures, medications. Review and summary of old records and obtaining of history. The risks and benefits of my recommendations, as well as other treatment options were discussed with the patient today. Questions were answered.    New Medications Ordered This Visit   Medications   • sodium-potassium-magnesium sulfates (SUPREP BOWEL PREP KIT) 17.5-3.13-1.6 GM/180ML solution oral solution     Sig: Take 1 bottle by mouth Every 12 (Twelve) Hours.     Dispense:  2 bottle     Refill:  0       Follow-up: Return in about 4 weeks (around 10/17/2018).          This document has been electronically signed by CARROLL Garcia on September 19, 2018 4:47 PM             Results for orders placed or performed during the hospital encounter of 01/11/18   Tissue Pathology Exam - Tissue, Gastric, Antrum   Result Value Ref Range    Case Report       Surgical  Pathology Report                         Case: [id number]                                  Authorizing Provider:  Vijay Rodriguez MD         Collected:           01/11/2018 05:03 PM          Ordering Location:     [facility]             Received:            01/12/2018 07:49 AM                                 [place] [facility]                                                     Pathologist:           Zaheer Jose MD                                                            Specimens:   1) - Gastric, Antrum                                                                                2) - Esophagus, Distal                                                                     Final Diagnosis       1.  GASTRIC ANTRUM, BIOPSY:  REACTIVE GASTROPATHY.    2.  DISTAL ESOPHAGUS, BIOPSY:  BENIGN SQUAMOUS EPITHELIUM.      Gross Description       1.  Two tan fragments measure 0.6 x 0.4 x 0.2 cm together.  Totally submitted.      2.  One gray fragment measures 0.5 x 0.5 x 0.1 cm.  Totally submitted.        Embedded Images     Results for orders placed or performed in visit on 08/01/17   Lipid Panel With LDL / HDL Ratio   Result Value Ref Range    Total Cholesterol 227 (H) 0 - 199 mg/dL    Triglycerides 155 20 - 199 mg/dL    HDL Cholesterol 58 (L) 60 - 200 mg/dL    LDL Cholesterol  138 (H) 0 - 129 mg/dL    VLDL Cholesterol 31 mg/dL    LDL/HDL Ratio 2.38 0.00 - 3.55   CBC (No Diff)   Result Value Ref Range    WBC 6.97 3.20 - 9.80 10*3/mm3    RBC 5.10 4.37 - 5.74 10*6/mm3    Hemoglobin 15.7 13.7 - 17.3 g/dL    Hematocrit 45.5 39.0 - 49.0 %    MCV 89.2 80.0 - 98.0 fL    MCH 30.8 26.5 - 34.0 pg    MCHC 34.5 31.5 - 36.3 g/dL    RDW 12.4 11.5 - 14.5 %    RDW-SD 40.0 35.1 - 43.9 fl    MPV 10.8 8.0 - 12.0 fL    Platelets 318 150 - 450 10*3/mm3   TSH   Result Value Ref Range    TSH 3.480 0.460 - 4.680 mIU/mL   T4, Free   Result Value Ref Range    Free T4 1.09 0.78 - 2.19 ng/dL   Iron   Result Value Ref Range    Iron 71 49 -  "181 mcg/dL   Hemoglobin A1c   Result Value Ref Range    Hemoglobin A1C 5.4 4 - 5.6 %   Vitamin B12   Result Value Ref Range    Vitamin B-12 517 239 - 931 pg/mL   Comprehensive Metabolic Panel   Result Value Ref Range    Glucose 86 60 - 100 mg/dL    BUN 17 7 - 21 mg/dL    Creatinine 0.99 0.70 - 1.30 mg/dL    Sodium 138 137 - 145 mmol/L    Potassium 3.9 3.5 - 5.1 mmol/L    Chloride 100 95 - 110 mmol/L    CO2 26.0 22.0 - 31.0 mmol/L    Calcium 10.1 8.4 - 10.2 mg/dL    Total Protein 7.9 6.3 - 8.6 g/dL    Albumin 5.10 (H) 3.40 - 4.80 g/dL    ALT (SGPT) 44 21 - 72 U/L    AST (SGOT) 56 17 - 59 U/L    Alkaline Phosphatase 77 38 - 126 U/L    Total Bilirubin 0.6 0.2 - 1.3 mg/dL    eGFR Non  Amer 82 63 - 147 mL/min/1.73    Globulin 2.8 2.3 - 3.5 gm/dL    A/G Ratio 1.8 1.1 - 1.8 g/dL    BUN/Creatinine Ratio 17.2 7.0 - 25.0    Anion Gap 12.0 5.0 - 15.0 mmol/L   Results for orders placed or performed during the hospital encounter of 02/02/17   POCT Influenza A/B   Result Value Ref Range    Rapid Influenza A Ag positive     Rapid Influenza B Ag negative     Internal Control Passed Passed    Lot Number [id number]     Expiration Date 6-30-18    Results for orders placed or performed in visit on 11/06/12   Converted Surgical Pathology   Result Value Ref Range    Spec Descr 1 SPECIMEN(S): A OSTEOCHRONDROMA     Preoperative Diagnosis         PREOPERATIVE DIAGNOSIS:  Mass, index finger, right      Postoperative Diagnosis         POSTOPERATIVE DIAGNOSIS:  Mass, right index finger      Gross Description         GROSS DESCRIPTION:  The specimen is labeled \"mass right index finger osteochondroma\".  Multiple bony fragments measure 2.5 x 2.0 x 0.5 cm together.  Totally  submitted for decalcification.      Final Diagnosis         FINAL DIAGNOSIS:  BONE, RIGHT INDEX FINGER:       MATURE BONE, CONSISTENT WITH OSTEOCHONDROMA.      CONVERTED (HISTORICAL) FINAL PATHOLOGIST       Diagnostician:  NAOMI DEL ROSARIO " M.D.  Pathologist  Electronically Signed 11/13/2012      Diagnosis Code   DIAGNOSIS CODE:  3      Results for orders placed or performed in visit on 11/22/11   Converted Surgical Pathology   Result Value Ref Range    Spec Descr 1 SPECIMEN(S): A SHAVINGS RIGHT SHOULDER     Preoperative Diagnosis         PREOPERATIVE DIAGNOSIS:  Right shoulder arthroscopy      Postoperative Diagnosis         POSTOPERATIVE DIAGNOSIS:  Right shoulder arthroscopy      Gross Description         GROSS DESCRIPTION:  The specimen consists of fragments of arthroscopy shavings from the  right knee measuring up to 0.5 cm in greatest dimension in aggregate  measuring 1.0 x 1.0 x 1.0 cm.  Representative sections are embedded.      Final Diagnosis         FINAL DIAGNOSIS:  FRAGMENTS OF BONE, SKELETAL MUSCLE AND OTHER SOFT TISSUES, RIGHT       SHOULDER.      CONVERTED (HISTORICAL) FINAL PATHOLOGIST       Diagnostician:  TOYA FLOYD M.D.  Pathologist  Electronically Signed 11/23/2011      Diagnosis Code   DIAGNOSIS CODE:  3

## 2018-10-08 LAB
LAB AP CASE REPORT: NORMAL
LAB AP DIAGNOSIS COMMENT: NORMAL
PATH REPORT.FINAL DX SPEC: NORMAL
PATH REPORT.GROSS SPEC: NORMAL

## 2018-10-25 ENCOUNTER — OFFICE VISIT (OUTPATIENT)
Dept: GASTROENTEROLOGY | Facility: CLINIC | Age: 48
End: 2018-10-25

## 2018-10-25 ENCOUNTER — LAB (OUTPATIENT)
Dept: LAB | Facility: HOSPITAL | Age: 48
End: 2018-10-25

## 2018-10-25 VITALS
BODY MASS INDEX: 37 KG/M2 | OXYGEN SATURATION: 97 % | SYSTOLIC BLOOD PRESSURE: 126 MMHG | DIASTOLIC BLOOD PRESSURE: 88 MMHG | HEIGHT: 72 IN | WEIGHT: 273.2 LBS | HEART RATE: 94 BPM

## 2018-10-25 DIAGNOSIS — R10.84 GENERALIZED ABDOMINAL PAIN: ICD-10-CM

## 2018-10-25 DIAGNOSIS — K52.9 COLITIS: ICD-10-CM

## 2018-10-25 DIAGNOSIS — K52.9 COLITIS: Primary | ICD-10-CM

## 2018-10-25 PROCEDURE — 83520 IMMUNOASSAY QUANT NOS NONAB: CPT

## 2018-10-25 PROCEDURE — 88350 IMFLUOR EA ADDL 1ANTB STN PX: CPT

## 2018-10-25 PROCEDURE — 81479 UNLISTED MOLECULAR PATHOLOGY: CPT

## 2018-10-25 PROCEDURE — 86140 C-REACTIVE PROTEIN: CPT

## 2018-10-25 PROCEDURE — 88346 IMFLUOR 1ST 1ANTB STAIN PX: CPT

## 2018-10-25 PROCEDURE — 36415 COLL VENOUS BLD VENIPUNCTURE: CPT

## 2018-10-25 PROCEDURE — 82397 CHEMILUMINESCENT ASSAY: CPT

## 2018-10-25 PROCEDURE — 99214 OFFICE O/P EST MOD 30 MIN: CPT | Performed by: NURSE PRACTITIONER

## 2018-10-25 RX ORDER — METRONIDAZOLE 500 MG/1
500 TABLET ORAL 2 TIMES DAILY
Qty: 20 TABLET | Refills: 0 | Status: SHIPPED | OUTPATIENT
Start: 2018-10-25 | End: 2018-12-06

## 2018-10-25 RX ORDER — CIPROFLOXACIN 500 MG/1
500 TABLET, FILM COATED ORAL EVERY 12 HOURS SCHEDULED
Qty: 28 TABLET | Refills: 0 | Status: SHIPPED | OUTPATIENT
Start: 2018-10-25 | End: 2018-12-06

## 2018-10-26 NOTE — PROGRESS NOTES
Chief Complaint   Patient presents with   • Constipation       Subjective    Venkatesh Grimes is a 48 y.o. male. he is here today for follow-up.    History of Present Illness  48-year-old male presents for follow-up.  He reports intermittent abdominal pain and has noted salad/lettuce to be severe trigger of frequent diarrhea abdominal pain nausea. States that when episodes of diarrhea he will be very constipated for several days. He underwent CT which noted no acute abnormality.  He has not seen blood in the stool recently.  He underwent colonoscopy 10/3/18 which noted adequate prep normal perianal and digital rectal exam.  A small polyp was found sigmoid colon which was removed.  Several random biopsies were collected.  Polyp was found to be hyperplastic.  Random colonic biopsy noted focal active colitis.  Plan; we'll start patient on Cipro and Flagyl for colitis follow-up in one month.  Recommend avoidance of known triggers of abdominal pain and diarrhea.  He will need repeat colonoscopy for surveillance in 3 years.  If there is no improvement with antibiotic we'll consider mesalamine.will obtain IBD panel.       The following portions of the patient's history were reviewed and updated as appropriate:   Past Medical History:   Diagnosis Date   • CTS (carpal tunnel syndrome)    • Hyperlipidemia    • Other obesity    • Tobacco dependence syndrome      Past Surgical History:   Procedure Laterality Date   • CARPAL TUNNEL RELEASE     • COLONOSCOPY     • COLONOSCOPY N/A 10/3/2018    Procedure: COLONOSCOPY;  Surgeon: Vijay Rodriguez MD;  Location: [facility] ENDOSCOPY;  Service: Gastroenterology   • ECHO - CONVERTED  06/10/2016    Ef 55-60%.Impiared LV relaxation. [facility]   • ENDOSCOPY N/A 1/11/2018    Procedure: ESOPHAGOGASTRODUODENOSCOPY possible dilation;  Surgeon: Vijay Rodriguez MD;  Location: [facility] ENDOSCOPY;  Service:    • HAND SURGERY  11/06/2012    Excision. Osteochondroma & exostosis of right  index finger of proximal phalanx   • ROTATOR CUFF REPAIR     • TONSILLECTOMY     • TONSILLECTOMY       Family History   Problem Relation Age of Onset   • Diabetes Mother    • Heart disease Mother         ischemic   • Hypertension Mother    • Heart failure Mother         congestive   • Hypertension Father    • Diabetes Father    • Stroke Father    • Cancer Father    • Heart disease Father    • Hypertension Sister    • Diabetes Sister    • Thyroid cancer Sister    • Cancer Sister    • Heart disease Brother    • Hypertension Brother    • Leukemia Other    • Cancer Paternal Grandmother        Current Outpatient Prescriptions   Medication Sig Dispense Refill   • ciprofloxacin (CIPRO) 500 MG tablet Take 1 tablet by mouth Every 12 (Twelve) Hours. 28 tablet 0   • metroNIDAZOLE (FLAGYL) 500 MG tablet Take 1 tablet by mouth 2 (Two) Times a Day. 20 tablet 0     No current facility-administered medications for this visit.      Allergies   Allergen Reactions   • Shellfish-Derived Products Swelling     Social History     Social History   • Marital status:      Social History Main Topics   • Smoking status: Former Smoker     Packs/day: 1.00     Types: Cigarettes   • Smokeless tobacco: Current User     Types: Chew   • Alcohol use Yes      Comment: occasional    • Drug use: No   • Sexual activity: Defer     Other Topics Concern   • Not on file       Review of Systems  Review of Systems   Constitutional: Positive for fatigue. Negative for activity change, appetite change, chills, diaphoresis, fever and unexpected weight change.   HENT: Negative for sore throat and trouble swallowing.    Respiratory: Negative for shortness of breath.    Gastrointestinal: Positive for abdominal distention, abdominal pain, blood in stool, constipation, diarrhea and nausea. Negative for anal bleeding, rectal pain and vomiting.   Musculoskeletal: Negative for arthralgias.   Skin: Negative for pallor.   Neurological: Negative for light-headedness.  "       /88 (BP Location: Left arm)   Pulse 94   Ht 182.9 cm (72\")   Wt 124 kg (273 lb 3.2 oz)   SpO2 97%   BMI 37.05 kg/m²     Objective    Physical Exam   Constitutional: He is oriented to person, place, and time. He appears well-developed and well-nourished. He is cooperative. No distress.   HENT:   Head: Normocephalic and atraumatic.   Neck: Normal range of motion. Neck supple. No thyromegaly present.   Cardiovascular: Normal rate, regular rhythm and normal heart sounds.    Pulmonary/Chest: Effort normal and breath sounds normal. He has no wheezes. He has no rhonchi. He has no rales.   Abdominal: Soft. Normal appearance and bowel sounds are normal. He exhibits no distension and no ascites. There is no hepatosplenomegaly. There is generalized tenderness. There is no rigidity and no guarding. No hernia.   Lymphadenopathy:     He has no cervical adenopathy.   Neurological: He is alert and oriented to person, place, and time.   Skin: Skin is warm, dry and intact. No rash noted. No pallor.   Psychiatric: He has a normal mood and affect. His speech is normal.     Admission on 10/03/2018, Discharged on 10/03/2018   Component Date Value Ref Range Status   • Case Report 10/03/2018    Final                    Value:Surgical Pathology Report                         Case: [id number]                                  Authorizing Provider:  Vijay Rodriguez MD        Collected:           10/03/2018 03:31 PM          Ordering Location:     [facility]             Received:            10/04/2018 11:14 AM                                 [place] [facility]                                                     Pathologist:           Zaheer Jose MD                                                           Specimens:   1) - Large Intestine, colonic mucosa                                                                2) - Large Intestine, Sigmoid Colon, polyp                                                • Final " Diagnosis 10/03/2018    Final                    Value:This result contains rich text formatting which cannot be displayed here.   • Comment 10/03/2018    Final                    Value:This result contains rich text formatting which cannot be displayed here.   • Gross Description 10/03/2018    Final                    Value:This result contains rich text formatting which cannot be displayed here.     Assessment/Plan      1. Colitis    2. Generalized abdominal pain    .       Orders placed during this encounter include:  Orders Placed This Encounter   Procedures   • IBD Sgi Diagnostic     Standing Status:   Future     Number of Occurrences:   1     Standing Expiration Date:   10/25/2019       * Surgery not found *    Review and/or summary of lab tests, radiology, procedures, medications. Review and summary of old records and obtaining of history. The risks and benefits of my recommendations, as well as other treatment options were discussed with the patient today. Questions were answered.    New Medications Ordered This Visit   Medications   • ciprofloxacin (CIPRO) 500 MG tablet     Sig: Take 1 tablet by mouth Every 12 (Twelve) Hours.     Dispense:  28 tablet     Refill:  0   • metroNIDAZOLE (FLAGYL) 500 MG tablet     Sig: Take 1 tablet by mouth 2 (Two) Times a Day.     Dispense:  20 tablet     Refill:  0       Follow-up: Return in about 6 weeks (around 12/6/2018).          This document has been electronically signed by CARROLL Garcia on October 26, 2018 3:18 PM             Results for orders placed or performed during the hospital encounter of 10/03/18   Tissue Pathology Exam   Result Value Ref Range    Case Report       Surgical Pathology Report                         Case: [id number]                                  Authorizing Provider:  Vijay Rodriguez MD        Collected:           10/03/2018 03:31 PM          Ordering Location:     [facility]             Received:            10/04/2018 11:14 AM      "                            [place] [facility]                                                     Pathologist:           Zaheer Jose MD                                                           Specimens:   1) - Large Intestine, colonic mucosa                                                                2) - Large Intestine, Sigmoid Colon, polyp                                                 Final Diagnosis       1.  COLONIC MUCOSA, RANDOM BIOPSY:  FOCAL ACTIVE COLITIS.    2.  SIGMOID COLON, BIOPSY:  HYPERPLASTIC POLYP.      Comment       The colonic mucosa shows focal cryptitis, most consistent with acute self limited colitis/infectious colitis.  Other entities, including Crohn's disease and C. difficile associated colitis, seem less likely but cannot be ruled out with certainty.  Clinical correlation is recommended.      Gross Description       1.  The first specimen is labeled \"CM\" and consists of 3 tan fragments measuring 0.8 x 0.7 x 0.2 cm together.  Totally submitted.    2.  The second specimen is labeled \"Sig P\" and  consists of 2 tan fragments measuring 0.5 x 0.3 x 0.2 cm together.  Totally submitted.     Results for orders placed or performed during the hospital encounter of 01/11/18   Tissue Pathology Exam - Tissue, Gastric, Antrum   Result Value Ref Range    Case Report       Surgical Pathology Report                         Case: [id number]                                  Authorizing Provider:  Vijay Rodriguez MD         Collected:           01/11/2018 05:03 PM          Ordering Location:     [facility]             Received:            01/12/2018 07:49 AM                                 [place] [facility]                                                     Pathologist:           Zaheer Jose MD                                                            Specimens:   1) - Gastric, Antrum                                                                                2) - " Esophagus, Distal                                                                     Final Diagnosis       1.  GASTRIC ANTRUM, BIOPSY:  REACTIVE GASTROPATHY.    2.  DISTAL ESOPHAGUS, BIOPSY:  BENIGN SQUAMOUS EPITHELIUM.      Gross Description       1.  Two tan fragments measure 0.6 x 0.4 x 0.2 cm together.  Totally submitted.      2.  One gray fragment measures 0.5 x 0.5 x 0.1 cm.  Totally submitted.        Embedded Images     Results for orders placed or performed in visit on 08/01/17   Lipid Panel With LDL / HDL Ratio   Result Value Ref Range    Total Cholesterol 227 (H) 0 - 199 mg/dL    Triglycerides 155 20 - 199 mg/dL    HDL Cholesterol 58 (L) 60 - 200 mg/dL    LDL Cholesterol  138 (H) 0 - 129 mg/dL    VLDL Cholesterol 31 mg/dL    LDL/HDL Ratio 2.38 0.00 - 3.55   CBC (No Diff)   Result Value Ref Range    WBC 6.97 3.20 - 9.80 10*3/mm3    RBC 5.10 4.37 - 5.74 10*6/mm3    Hemoglobin 15.7 13.7 - 17.3 g/dL    Hematocrit 45.5 39.0 - 49.0 %    MCV 89.2 80.0 - 98.0 fL    MCH 30.8 26.5 - 34.0 pg    MCHC 34.5 31.5 - 36.3 g/dL    RDW 12.4 11.5 - 14.5 %    RDW-SD 40.0 35.1 - 43.9 fl    MPV 10.8 8.0 - 12.0 fL    Platelets 318 150 - 450 10*3/mm3   TSH   Result Value Ref Range    TSH 3.480 0.460 - 4.680 mIU/mL   T4, Free   Result Value Ref Range    Free T4 1.09 0.78 - 2.19 ng/dL   Iron   Result Value Ref Range    Iron 71 49 - 181 mcg/dL   Hemoglobin A1c   Result Value Ref Range    Hemoglobin A1C 5.4 4 - 5.6 %   Vitamin B12   Result Value Ref Range    Vitamin B-12 517 239 - 931 pg/mL   Comprehensive Metabolic Panel   Result Value Ref Range    Glucose 86 60 - 100 mg/dL    BUN 17 7 - 21 mg/dL    Creatinine 0.99 0.70 - 1.30 mg/dL    Sodium 138 137 - 145 mmol/L    Potassium 3.9 3.5 - 5.1 mmol/L    Chloride 100 95 - 110 mmol/L    CO2 26.0 22.0 - 31.0 mmol/L    Calcium 10.1 8.4 - 10.2 mg/dL    Total Protein 7.9 6.3 - 8.6 g/dL    Albumin 5.10 (H) 3.40 - 4.80 g/dL    ALT (SGPT) 44 21 - 72 U/L    AST (SGOT) 56 17 - 59 U/L    Alkaline  "Phosphatase 77 38 - 126 U/L    Total Bilirubin 0.6 0.2 - 1.3 mg/dL    eGFR Non  Amer 82 63 - 147 mL/min/1.73    Globulin 2.8 2.3 - 3.5 gm/dL    A/G Ratio 1.8 1.1 - 1.8 g/dL    BUN/Creatinine Ratio 17.2 7.0 - 25.0    Anion Gap 12.0 5.0 - 15.0 mmol/L   Results for orders placed or performed during the hospital encounter of 02/02/17   POCT Influenza A/B   Result Value Ref Range    Rapid Influenza A Ag positive     Rapid Influenza B Ag negative     Internal Control Passed Passed    Lot Number [id number]     Expiration Date 6-30-18    Results for orders placed or performed in visit on 11/06/12   Converted Surgical Pathology   Result Value Ref Range    Spec Descr 1 SPECIMEN(S): A OSTEOCHRONDROMA     Preoperative Diagnosis         PREOPERATIVE DIAGNOSIS:  Mass, index finger, right      Postoperative Diagnosis         POSTOPERATIVE DIAGNOSIS:  Mass, right index finger      Gross Description         GROSS DESCRIPTION:  The specimen is labeled \"mass right index finger osteochondroma\".  Multiple bony fragments measure 2.5 x 2.0 x 0.5 cm together.  Totally  submitted for decalcification.      Final Diagnosis         FINAL DIAGNOSIS:  BONE, RIGHT INDEX FINGER:       MATURE BONE, CONSISTENT WITH OSTEOCHONDROMA.      CONVERTED (HISTORICAL) FINAL PATHOLOGIST       Diagnostician:  NAOMI DEL ROSARIO M.D.  Pathologist  Electronically Signed 11/13/2012      Diagnosis Code   DIAGNOSIS CODE:  3      Results for orders placed or performed in visit on 11/22/11   Converted Surgical Pathology   Result Value Ref Range    Spec Descr 1 SPECIMEN(S): A SHAVINGS RIGHT SHOULDER     Preoperative Diagnosis         PREOPERATIVE DIAGNOSIS:  Right shoulder arthroscopy      Postoperative Diagnosis         POSTOPERATIVE DIAGNOSIS:  Right shoulder arthroscopy      Gross Description         GROSS DESCRIPTION:  The specimen consists of fragments of arthroscopy shavings from the  right knee measuring up to 0.5 cm in greatest dimension in " aggregate  measuring 1.0 x 1.0 x 1.0 cm.  Representative sections are embedded.      Final Diagnosis         FINAL DIAGNOSIS:  FRAGMENTS OF BONE, SKELETAL MUSCLE AND OTHER SOFT TISSUES, RIGHT       SHOULDER.      CONVERTED (HISTORICAL) FINAL PATHOLOGIST       Diagnostician:  TOYA FLOYD M.D.  Pathologist  Electronically Signed 11/23/2011      Diagnosis Code   DIAGNOSIS CODE:  3

## 2018-10-26 NOTE — PATIENT INSTRUCTIONS
Colitis  Colitis is inflammation of the colon. Colitis may last a short time (acute) or it may last a long time (chronic).  What are the causes?  This condition may be caused by:  · Viruses.  · Bacteria.  · Reactions to medicine.  · Certain autoimmune diseases, such as Crohn disease or ulcerative colitis.    What are the signs or symptoms?  Symptoms of this condition include:  · Diarrhea.  · Passing bloody or tarry stool.  · Pain.  · Fever.  · Vomiting.  · Tiredness (fatigue).  · Weight loss.  · Bloating.  · Sudden increase in abdominal pain.  · Having fewer bowel movements than usual.    How is this diagnosed?  This condition is diagnosed with a stool test or a blood test. You may also have other tests, including X-rays, a CT scan, or a colonoscopy.  How is this treated?  Treatment may include:  · Resting the bowel. This involves not eating or drinking for a period of time.  · Fluids that are given through an IV tube.  · Medicine for pain and diarrhea.  · Antibiotic medicines.  · Cortisone medicines.  · Surgery.    Follow these instructions at home:  Eating and drinking  · Follow instructions from your health care provider about eating or drinking restrictions.  · Drink enough fluid to keep your urine clear or pale yellow.  · Work with a dietitian to determine which foods cause your condition to flare up.  · Avoid foods that cause flare-ups.  · Eat a well-balanced diet.  Medicines  · Take over-the-counter and prescription medicines only as told by your health care provider.  · If you were prescribed an antibiotic medicine, take it as told by your health care provider. Do not stop taking the antibiotic even if you start to feel better.  General instructions  · Keep all follow-up visits as told by your health care provider. This is important.  Contact a health care provider if:  · Your symptoms do not go away.  · You develop new symptoms.  Get help right away if:  · You have a fever that does not go away with  treatment.  · You develop chills.  · You have extreme weakness, fainting, or dehydration.  · You have repeated vomiting.  · You develop severe pain in your abdomen.  · You pass bloody or tarry stool.  This information is not intended to replace advice given to you by your health care provider. Make sure you discuss any questions you have with your health care provider.  Document Released: 01/25/2006 Document Revised: 05/25/2017 Document Reviewed: 04/11/2016  [employer] Interactive Patient Education © 2018 [employer]

## 2018-10-29 ENCOUNTER — TELEPHONE (OUTPATIENT)
Dept: GASTROENTEROLOGY | Facility: CLINIC | Age: 48
End: 2018-10-29

## 2018-10-29 NOTE — TELEPHONE ENCOUNTER
Per Star,gastroenterology referral coordinator,the lab IBD Sgi diagnostic does not require a pre auth from the patients insurance.

## 2018-11-01 LAB — SPECIMEN STATUS: NORMAL

## 2018-12-06 ENCOUNTER — OFFICE VISIT (OUTPATIENT)
Dept: GASTROENTEROLOGY | Facility: CLINIC | Age: 48
End: 2018-12-06

## 2018-12-06 VITALS
HEIGHT: 72 IN | BODY MASS INDEX: 37.6 KG/M2 | OXYGEN SATURATION: 95 % | WEIGHT: 277.6 LBS | HEART RATE: 93 BPM | DIASTOLIC BLOOD PRESSURE: 86 MMHG | SYSTOLIC BLOOD PRESSURE: 142 MMHG

## 2018-12-06 DIAGNOSIS — K58.0 IRRITABLE BOWEL SYNDROME WITH DIARRHEA: Primary | ICD-10-CM

## 2018-12-06 PROCEDURE — 99214 OFFICE O/P EST MOD 30 MIN: CPT | Performed by: NURSE PRACTITIONER

## 2018-12-06 RX ORDER — HYOSCYAMINE SULFATE 0.125 MG
0.12 TABLET ORAL
Qty: 120 TABLET | Refills: 5 | Status: SHIPPED | OUTPATIENT
Start: 2018-12-06 | End: 2020-10-26

## 2018-12-06 NOTE — PATIENT INSTRUCTIONS
Diet for Irritable Bowel Syndrome  When you have irritable bowel syndrome (IBS), the foods you eat and your eating habits are very important. IBS may cause various symptoms, such as abdominal pain, constipation, or diarrhea. Choosing the right foods can help ease discomfort caused by these symptoms. Work with your health care provider and dietitian to find the best eating plan to help control your symptoms.  What general guidelines do I need to follow?  · Keep a food diary. This will help you identify foods that cause symptoms. Write down:  ? What you eat and when.  ? What symptoms you have.  ? When symptoms occur in relation to your meals.  · Avoid foods that cause symptoms. Talk with your dietitian about other ways to get the same nutrients that are in these foods.  · Eat more foods that contain fiber. Take a fiber supplement if directed by your dietitian.  · Eat your meals slowly, in a relaxed setting.  · Aim to eat 5-6 small meals per day. Do not skip meals.  · Drink enough fluids to keep your urine clear or pale yellow.  · Ask your health care provider if you should take an over-the-counter probiotic during flare-ups to help restore healthy gut bacteria.  · If you have cramping or diarrhea, try making your meals low in fat and high in carbohydrates. Examples of carbohydrates are pasta, rice, whole grain breads and cereals, fruits, and vegetables.  · If dairy products cause your symptoms to flare up, try eating less of them. You might be able to handle yogurt better than other dairy products because it contains bacteria that help with digestion.  What foods are not recommended?  The following are some foods and drinks that may worsen your symptoms:  · Fatty foods, such as French fries.  · Milk products, such as cheese or ice cream.  · Chocolate.  · Alcohol.  · Products with caffeine, such as coffee.  · Carbonated drinks, such as soda.    The items listed above may not be a complete list of foods and beverages to  avoid. Contact your dietitian for more information.  What foods are good sources of fiber?  Your health care provider or dietitian may recommend that you eat more foods that contain fiber. Fiber can help reduce constipation and other IBS symptoms. Add foods with fiber to your diet a little at a time so that your body can get used to them. Too much fiber at once might cause gas and swelling of your abdomen. The following are some foods that are good sources of fiber:  · Apples.  · Peaches.  · Pears.  · Berries.  · Figs.  · Broccoli (raw).  · Cabbage.  · Carrots.  · Raw peas.  · Kidney beans.  · Lima beans.  · Whole grain bread.  · Whole grain cereal.    Where to find more information:  International Foundation for Functional Gastrointestinal Disorders: www.iffgd.org  National [place] of Diabetes and Digestive and Kidney Diseases: www.niddk.nih.gov/health-information/health-topics/digestive-diseases/ibs/Pages/facts.aspx  This information is not intended to replace advice given to you by your health care provider. Make sure you discuss any questions you have with your health care provider.  Document Released: 03/09/2005 Document Revised: 05/25/2017 Document Reviewed: 03/20/2015  [employer] Interactive Patient Education © 2018 [employer]

## 2018-12-06 NOTE — PROGRESS NOTES
Chief Complaint   Patient presents with   • Constipation       Subjective    Venkatesh Grimes is a 48 y.o. male. he is here today for follow-up.    History of Present Illness  48-year-old male presents for follow-up after treatment of colitis with Cipro and Flagyl.  States diarrhea has resolved however he is still having 2-3 bowel movements per day but described as formed stool.  He said he seen blood in his stool once while on antibiotics however none since.  Colonoscopy was completed 10/3/18 and noted a small polyp in the sigmoid and random biopsy consistent with colitis.  We did IBD panel which was not consistent with inflammatory bowel disease.  Patient reports intermittent abdominal cramping and pain.  Plan; recommended fiber daily.  We'll start patient on Levsin for abdominal cramping.  Follow-up in 6 months return to office sooner if needed.       The following portions of the patient's history were reviewed and updated as appropriate:   Past Medical History:   Diagnosis Date   • CTS (carpal tunnel syndrome)    • Hyperlipidemia    • Other obesity    • Tobacco dependence syndrome      Past Surgical History:   Procedure Laterality Date   • CARPAL TUNNEL RELEASE     • COLONOSCOPY     • ECHO - CONVERTED  06/10/2016    Ef 55-60%.Impiared LV relaxation. [facility]   • HAND SURGERY  11/06/2012    Excision. Osteochondroma & exostosis of right index finger of proximal phalanx   • ROTATOR CUFF REPAIR     • TONSILLECTOMY     • TONSILLECTOMY       Family History   Problem Relation Age of Onset   • Diabetes Mother    • Heart disease Mother         ischemic   • Hypertension Mother    • Heart failure Mother         congestive   • Hypertension Father    • Diabetes Father    • Stroke Father    • Cancer Father    • Heart disease Father    • Hypertension Sister    • Diabetes Sister    • Thyroid cancer Sister    • Cancer Sister    • Heart disease Brother    • Hypertension Brother    • Leukemia Other    • Cancer  "Paternal Grandmother        Current Outpatient Medications   Medication Sig Dispense Refill   • hyoscyamine (ANASPAZ,LEVSIN) 0.125 MG tablet Take 1 tablet by mouth 4 (Four) Times a Day With Meals & at Bedtime. 120 tablet 5     No current facility-administered medications for this visit.      Allergies   Allergen Reactions   • Shellfish-Derived Products Swelling     Social History     Socioeconomic History   • Marital status:      Spouse name: Not on file   • Number of children: Not on file   • Years of education: Not on file   • Highest education level: Not on file   Tobacco Use   • Smoking status: Former Smoker     Packs/day: 1.00     Types: Cigarettes   • Smokeless tobacco: Current User     Types: Chew   Substance and Sexual Activity   • Alcohol use: Yes     Comment: occasional    • Drug use: No   • Sexual activity: Defer       Review of Systems  Review of Systems   Constitutional: Positive for fatigue. Negative for activity change, appetite change, chills, diaphoresis, fever and unexpected weight change.   HENT: Negative for sore throat and trouble swallowing.    Respiratory: Negative for shortness of breath.    Gastrointestinal: Positive for abdominal pain and diarrhea (solid stool about 2-3 times per day ). Negative for abdominal distention, anal bleeding, blood in stool, constipation, nausea, rectal pain and vomiting.   Musculoskeletal: Negative for arthralgias.   Skin: Negative for pallor.   Neurological: Negative for light-headedness.        /86 (BP Location: Left arm)   Pulse 93   Ht 182.9 cm (72\")   Wt 126 kg (277 lb 9.6 oz)   SpO2 95%   BMI 37.65 kg/m²     Objective    Physical Exam   Constitutional: He is oriented to person, place, and time. He appears well-developed and well-nourished. He is cooperative. No distress.   HENT:   Head: Normocephalic and atraumatic.   Neck: Normal range of motion. Neck supple. No thyromegaly present.   Cardiovascular: Normal rate, regular rhythm and normal " heart sounds.   Pulmonary/Chest: Effort normal and breath sounds normal. He has no wheezes. He has no rhonchi. He has no rales.   Abdominal: Soft. Normal appearance and bowel sounds are normal. He exhibits no distension. There is no hepatosplenomegaly. There is tenderness in the left lower quadrant. There is no rigidity and no guarding. No hernia.   Lymphadenopathy:     He has no cervical adenopathy.   Neurological: He is alert and oriented to person, place, and time.   Skin: Skin is warm, dry and intact. No rash noted. No pallor.   Psychiatric: He has a normal mood and affect. His speech is normal.     Lab on 10/25/2018   Component Date Value Ref Range Status   • Specimen Status 10/25/2018 Comment   Final    Reference lab report sent via fax.     Assessment/Plan      1. Irritable bowel syndrome with diarrhea    .       Orders placed during this encounter include:  No orders of the defined types were placed in this encounter.      * Surgery not found *    Review and/or summary of lab tests, radiology, procedures, medications. Review and summary of old records and obtaining of history. The risks and benefits of my recommendations, as well as other treatment options were discussed with the patient today. Questions were answered.    New Medications Ordered This Visit   Medications   • hyoscyamine (ANASPAZ,LEVSIN) 0.125 MG tablet     Sig: Take 1 tablet by mouth 4 (Four) Times a Day With Meals & at Bedtime.     Dispense:  120 tablet     Refill:  5       Follow-up: Return in about 6 months (around 6/6/2019).          This document has been electronically signed by CARROLL Garcia on December 6, 2018 3:33 PM             Results for orders placed or performed in visit on 10/25/18   IBD Sgi Diagnostic   Result Value Ref Range    Specimen Status Comment    Results for orders placed or performed during the hospital encounter of 10/03/18   Tissue Pathology Exam   Result Value Ref Range    Case Report       Surgical Pathology  "Report                         Case: [id number]                                  Authorizing Provider:  Vijay Rodriguez MD        Collected:           10/03/2018 03:31 PM          Ordering Location:     [facility]             Received:            10/04/2018 11:14 AM                                 [facility]                                                     Pathologist:           Zaheer Jose MD                                                           Specimens:   1) - Large Intestine, colonic mucosa                                                                2) - Large Intestine, Sigmoid Colon, polyp                                                 Final Diagnosis       1.  COLONIC MUCOSA, RANDOM BIOPSY:  FOCAL ACTIVE COLITIS.    2.  SIGMOID COLON, BIOPSY:  HYPERPLASTIC POLYP.      Comment       The colonic mucosa shows focal cryptitis, most consistent with acute self limited colitis/infectious colitis.  Other entities, including Crohn's disease and C. difficile associated colitis, seem less likely but cannot be ruled out with certainty.  Clinical correlation is recommended.      Gross Description       1.  The first specimen is labeled \"CM\" and consists of 3 tan fragments measuring 0.8 x 0.7 x 0.2 cm together.  Totally submitted.    2.  The second specimen is labeled \"Sig P\" and  consists of 2 tan fragments measuring 0.5 x 0.3 x 0.2 cm together.  Totally submitted.     Results for orders placed or performed during the hospital encounter of 01/11/18   Tissue Pathology Exam - Tissue, Gastric, Antrum   Result Value Ref Range    Case Report       Surgical Pathology Report                         Case: [id number]                                  Authorizing Provider:  Vijay Rodriguez MD         Collected:           01/11/2018 05:03 PM          Ordering Location:     [facility]             Received:            01/12/2018 07:49 AM                                 [facility]     "                                                 Pathologist:           Zaheer Jose MD                                                            Specimens:   1) - Gastric, Antrum                                                                                2) - Esophagus, Distal                                                                     Final Diagnosis       1.  GASTRIC ANTRUM, BIOPSY:  REACTIVE GASTROPATHY.    2.  DISTAL ESOPHAGUS, BIOPSY:  BENIGN SQUAMOUS EPITHELIUM.      Gross Description       1.  Two tan fragments measure 0.6 x 0.4 x 0.2 cm together.  Totally submitted.      2.  One gray fragment measures 0.5 x 0.5 x 0.1 cm.  Totally submitted.        Embedded Images     Results for orders placed or performed in visit on 08/01/17   Lipid Panel With LDL / HDL Ratio   Result Value Ref Range    Total Cholesterol 227 (H) 0 - 199 mg/dL    Triglycerides 155 20 - 199 mg/dL    HDL Cholesterol 58 (L) 60 - 200 mg/dL    LDL Cholesterol  138 (H) 0 - 129 mg/dL    VLDL Cholesterol 31 mg/dL    LDL/HDL Ratio 2.38 0.00 - 3.55   CBC (No Diff)   Result Value Ref Range    WBC 6.97 3.20 - 9.80 10*3/mm3    RBC 5.10 4.37 - 5.74 10*6/mm3    Hemoglobin 15.7 13.7 - 17.3 g/dL    Hematocrit 45.5 39.0 - 49.0 %    MCV 89.2 80.0 - 98.0 fL    MCH 30.8 26.5 - 34.0 pg    MCHC 34.5 31.5 - 36.3 g/dL    RDW 12.4 11.5 - 14.5 %    RDW-SD 40.0 35.1 - 43.9 fl    MPV 10.8 8.0 - 12.0 fL    Platelets 318 150 - 450 10*3/mm3   TSH   Result Value Ref Range    TSH 3.480 0.460 - 4.680 mIU/mL   T4, Free   Result Value Ref Range    Free T4 1.09 0.78 - 2.19 ng/dL   Iron   Result Value Ref Range    Iron 71 49 - 181 mcg/dL   Hemoglobin A1c   Result Value Ref Range    Hemoglobin A1C 5.4 4 - 5.6 %   Vitamin B12   Result Value Ref Range    Vitamin B-12 517 239 - 931 pg/mL   Comprehensive Metabolic Panel   Result Value Ref Range    Glucose 86 60 - 100 mg/dL    BUN 17 7 - 21 mg/dL    Creatinine 0.99 0.70 - 1.30 mg/dL    Sodium 138 137 - 145 mmol/L     "Potassium 3.9 3.5 - 5.1 mmol/L    Chloride 100 95 - 110 mmol/L    CO2 26.0 22.0 - 31.0 mmol/L    Calcium 10.1 8.4 - 10.2 mg/dL    Total Protein 7.9 6.3 - 8.6 g/dL    Albumin 5.10 (H) 3.40 - 4.80 g/dL    ALT (SGPT) 44 21 - 72 U/L    AST (SGOT) 56 17 - 59 U/L    Alkaline Phosphatase 77 38 - 126 U/L    Total Bilirubin 0.6 0.2 - 1.3 mg/dL    eGFR Non  Amer 82 63 - 147 mL/min/1.73    Globulin 2.8 2.3 - 3.5 gm/dL    A/G Ratio 1.8 1.1 - 1.8 g/dL    BUN/Creatinine Ratio 17.2 7.0 - 25.0    Anion Gap 12.0 5.0 - 15.0 mmol/L   Results for orders placed or performed during the hospital encounter of 02/02/17   POCT Influenza A/B   Result Value Ref Range    Rapid Influenza A Ag positive     Rapid Influenza B Ag negative     Internal Control Passed Passed    Lot Number [id number]     Expiration Date 6-30-18    Results for orders placed or performed in visit on 11/06/12   Converted Surgical Pathology   Result Value Ref Range    Spec Descr 1 SPECIMEN(S): A OSTEOCHRONDROMA     Preoperative Diagnosis         PREOPERATIVE DIAGNOSIS:  Mass, index finger, right      Postoperative Diagnosis         POSTOPERATIVE DIAGNOSIS:  Mass, right index finger      Gross Description         GROSS DESCRIPTION:  The specimen is labeled \"mass right index finger osteochondroma\".  Multiple bony fragments measure 2.5 x 2.0 x 0.5 cm together.  Totally  submitted for decalcification.      Final Diagnosis         FINAL DIAGNOSIS:  BONE, RIGHT INDEX FINGER:       MATURE BONE, CONSISTENT WITH OSTEOCHONDROMA.      CONVERTED (HISTORICAL) FINAL PATHOLOGIST       Diagnostician:  NAOMI DEL ROSARIO M.D.  Pathologist  Electronically Signed 11/13/2012      Diagnosis Code   DIAGNOSIS CODE:  3      Results for orders placed or performed in visit on 11/22/11   Converted Surgical Pathology   Result Value Ref Range    Spec Descr 1 SPECIMEN(S): A SHAVINGS RIGHT SHOULDER     Preoperative Diagnosis         PREOPERATIVE DIAGNOSIS:  Right shoulder arthroscopy      " Postoperative Diagnosis         POSTOPERATIVE DIAGNOSIS:  Right shoulder arthroscopy      Gross Description         GROSS DESCRIPTION:  The specimen consists of fragments of arthroscopy shavings from the  right knee measuring up to 0.5 cm in greatest dimension in aggregate  measuring 1.0 x 1.0 x 1.0 cm.  Representative sections are embedded.      Final Diagnosis         FINAL DIAGNOSIS:  FRAGMENTS OF BONE, SKELETAL MUSCLE AND OTHER SOFT TISSUES, RIGHT       SHOULDER.      CONVERTED (HISTORICAL) FINAL PATHOLOGIST       Diagnostician:  TOYA FLOYD M.D.  Pathologist  Electronically Signed 11/23/2011      Diagnosis Code   DIAGNOSIS CODE:  3

## 2020-10-26 ENCOUNTER — OFFICE VISIT (OUTPATIENT)
Dept: FAMILY MEDICINE CLINIC | Facility: CLINIC | Age: 50
End: 2020-10-26

## 2020-10-26 VITALS
RESPIRATION RATE: 18 BRPM | HEIGHT: 72 IN | DIASTOLIC BLOOD PRESSURE: 100 MMHG | BODY MASS INDEX: 41.09 KG/M2 | OXYGEN SATURATION: 98 % | HEART RATE: 98 BPM | WEIGHT: 303.4 LBS | SYSTOLIC BLOOD PRESSURE: 162 MMHG

## 2020-10-26 DIAGNOSIS — K92.1 BLOOD IN STOOL: ICD-10-CM

## 2020-10-26 DIAGNOSIS — M54.50 LOW BACK PAIN, UNSPECIFIED BACK PAIN LATERALITY, UNSPECIFIED CHRONICITY, UNSPECIFIED WHETHER SCIATICA PRESENT: Primary | ICD-10-CM

## 2020-10-26 DIAGNOSIS — K58.9 IRRITABLE BOWEL SYNDROME, UNSPECIFIED TYPE: ICD-10-CM

## 2020-10-26 PROCEDURE — 99204 OFFICE O/P NEW MOD 45 MIN: CPT | Performed by: FAMILY MEDICINE

## 2020-10-26 RX ORDER — DICYCLOMINE HYDROCHLORIDE 10 MG/1
10 CAPSULE ORAL
Qty: 90 CAPSULE | Refills: 2 | Status: SHIPPED | OUTPATIENT
Start: 2020-10-26 | End: 2021-01-12 | Stop reason: SDUPTHER

## 2020-10-26 RX ORDER — PREDNISONE 10 MG/1
TABLET ORAL
Qty: 30 TABLET | Refills: 0 | Status: SHIPPED | OUTPATIENT
Start: 2020-10-26 | End: 2021-01-07

## 2020-10-26 RX ORDER — METHOCARBAMOL 500 MG/1
500 TABLET, FILM COATED ORAL 4 TIMES DAILY
Qty: 56 TABLET | Refills: 1 | Status: SHIPPED | OUTPATIENT
Start: 2020-10-26 | End: 2021-01-07

## 2020-10-26 NOTE — PROGRESS NOTES
Subjective   Venkatesh Grimes is a 50 y.o. male.   .Cc: follow up of chronic medical issues    Back Pain  This is a chronic problem. The current episode started more than 1 year ago. The problem occurs daily. The pain is present in the gluteal and lumbar spine. The quality of the pain is described as aching. The pain is at a severity of 8/10. The pain is worse during the day. The symptoms are aggravated by lying down, sitting and standing. Associated symptoms include abdominal pain, headaches and pelvic pain. Pertinent negatives include no bladder incontinence, bowel incontinence, chest pain, dysuria, fever, leg pain, numbness, paresthesias, perianal numbness, tingling or weakness.   He has noticed blood in his stool back in June of 2020.He has a history of IBS. Colonoscopy two years didn't demonstrate more than a polyp.He has some abdominal cramping. Certain foods can set this off.    The following portions of the patient's history were reviewed and updated as appropriate: allergies, current medications, past family history, past medical history, past social history, past surgical history and problem list.    Review of Systems   Constitutional: Positive for fatigue. Negative for activity change, appetite change and fever.   HENT: Positive for hearing loss, postnasal drip, rhinorrhea, tinnitus and trouble swallowing. Negative for congestion, drooling, ear discharge, ear pain, facial swelling, mouth sores, nosebleeds, sinus pressure and sinus pain.    Eyes: Negative for photophobia, pain, discharge, redness and itching.   Respiratory: Negative for cough, chest tightness and wheezing.    Cardiovascular: Negative for chest pain, palpitations and leg swelling.   Gastrointestinal: Positive for abdominal pain, anal bleeding, constipation and diarrhea. Negative for abdominal distention, bowel incontinence, nausea, rectal pain and vomiting.   Endocrine: Positive for polyuria. Negative for cold intolerance, heat  intolerance, polydipsia and polyphagia.   Genitourinary: Positive for pelvic pain. Negative for bladder incontinence, decreased urine volume, difficulty urinating, discharge, dysuria, enuresis, flank pain, frequency, genital sores, hematuria, penile pain, penile swelling, scrotal swelling, testicular pain and urgency.   Musculoskeletal: Positive for arthralgias and back pain. Negative for gait problem, myalgias, neck pain and neck stiffness.   Skin: Negative for color change and rash.   Allergic/Immunologic: Positive for food allergies. Negative for environmental allergies and immunocompromised state.   Neurological: Positive for headaches. Negative for dizziness, tingling, tremors, seizures, syncope, speech difficulty, weakness, light-headedness, numbness and paresthesias.   Hematological: Negative for adenopathy. Does not bruise/bleed easily.   Psychiatric/Behavioral: Negative for agitation, behavioral problems, confusion, decreased concentration, dysphoric mood, hallucinations, self-injury and sleep disturbance. The patient is nervous/anxious. The patient is not hyperactive.        Objective   Physical Exam  Vitals signs reviewed.   Constitutional:       Appearance: Normal appearance.   HENT:      Head: Normocephalic and atraumatic.      Right Ear: Tympanic membrane and external ear normal.      Left Ear: Tympanic membrane and external ear normal.      Nose: Nose normal.      Mouth/Throat:      Mouth: Mucous membranes are moist.      Pharynx: Oropharynx is clear.   Eyes:      Extraocular Movements: Extraocular movements intact.      Conjunctiva/sclera: Conjunctivae normal.      Pupils: Pupils are equal, round, and reactive to light.   Neck:      Musculoskeletal: Normal range of motion and neck supple.   Cardiovascular:      Rate and Rhythm: Normal rate and regular rhythm.      Heart sounds: Normal heart sounds. No murmur. No friction rub. No gallop.    Pulmonary:      Effort: Pulmonary effort is normal. No  "respiratory distress.      Breath sounds: Normal breath sounds. No stridor. No wheezing, rhonchi or rales.   Chest:      Chest wall: No tenderness.   Abdominal:      General: Abdomen is flat.      Palpations: Abdomen is soft. There is no mass.      Tenderness: There is no abdominal tenderness.   Musculoskeletal:      Comments: Tenderness on palpation in the lower lumbar area over the right pelvic rim going down to the Gluteus Gerardo.   Skin:     General: Skin is warm and dry.   Neurological:      Mental Status: He is alert.           Visit Vitals  /100 (BP Location: Left arm, Patient Position: Sitting, Cuff Size: Large Adult)   Pulse 98   Resp 18   Ht 182.9 cm (72\")   Wt (!) 138 kg (303 lb 6.4 oz)   SpO2 98%   BMI 41.15 kg/m²     Body mass index is 41.15 kg/m².      Assessment/Plan   Diagnoses and all orders for this visit:    1. Low back pain, unspecified back pain laterality, unspecified chronicity, unspecified whether sciatica present (Primary)  -     Comprehensive metabolic panel; Future  -     CBC w AUTO Differential; Future    2. Blood in stool    3. Irritable bowel syndrome, unspecified type    Other orders  -     predniSONE (DELTASONE) 10 MG tablet; 4 daily times three days, 3 daily times three days, 2 daily times three days,1 daily times three days  Dispense: 30 tablet; Refill: 0  -     methocarbamol (Robaxin) 500 MG tablet; Take 1 tablet by mouth 4 (Four) Times a Day.  Dispense: 56 tablet; Refill: 1  -     dicyclomine (Bentyl) 10 MG capsule; Take 1 capsule by mouth 4 (Four) Times a Day Before Meals & at Bedtime.  Dispense: 90 capsule; Refill: 2    Return to the clinic in 2 week/s.  Will contact with results as needed.    "

## 2020-10-27 ENCOUNTER — LAB (OUTPATIENT)
Dept: LAB | Facility: HOSPITAL | Age: 50
End: 2020-10-27

## 2020-10-27 DIAGNOSIS — M54.50 LOW BACK PAIN, UNSPECIFIED BACK PAIN LATERALITY, UNSPECIFIED CHRONICITY, UNSPECIFIED WHETHER SCIATICA PRESENT: ICD-10-CM

## 2020-10-27 PROCEDURE — 36415 COLL VENOUS BLD VENIPUNCTURE: CPT

## 2020-10-27 PROCEDURE — 85025 COMPLETE CBC W/AUTO DIFF WBC: CPT

## 2020-10-27 PROCEDURE — 80053 COMPREHEN METABOLIC PANEL: CPT

## 2020-10-28 LAB
ALBUMIN SERPL-MCNC: 5.2 G/DL (ref 3.5–5.2)
ALBUMIN/GLOB SERPL: 2.1 G/DL
ALP SERPL-CCNC: 68 U/L (ref 39–117)
ALT SERPL W P-5'-P-CCNC: 35 U/L (ref 1–41)
ANION GAP SERPL CALCULATED.3IONS-SCNC: 12.1 MMOL/L (ref 5–15)
AST SERPL-CCNC: 27 U/L (ref 1–40)
BASOPHILS # BLD AUTO: 0.03 10*3/MM3 (ref 0–0.2)
BASOPHILS NFR BLD AUTO: 0.3 % (ref 0–1.5)
BILIRUB SERPL-MCNC: 0.4 MG/DL (ref 0–1.2)
BUN SERPL-MCNC: 13 MG/DL (ref 6–20)
BUN/CREAT SERPL: 13.8 (ref 7–25)
CALCIUM SPEC-SCNC: 10.3 MG/DL (ref 8.6–10.5)
CHLORIDE SERPL-SCNC: 102 MMOL/L (ref 98–107)
CO2 SERPL-SCNC: 25.9 MMOL/L (ref 22–29)
CREAT SERPL-MCNC: 0.94 MG/DL (ref 0.76–1.27)
DEPRECATED RDW RBC AUTO: 40.1 FL (ref 37–54)
EOSINOPHIL # BLD AUTO: 0.01 10*3/MM3 (ref 0–0.4)
EOSINOPHIL NFR BLD AUTO: 0.1 % (ref 0.3–6.2)
ERYTHROCYTE [DISTWIDTH] IN BLOOD BY AUTOMATED COUNT: 12.4 % (ref 12.3–15.4)
GFR SERPL CREATININE-BSD FRML MDRD: 86 ML/MIN/1.73
GLOBULIN UR ELPH-MCNC: 2.5 GM/DL
GLUCOSE SERPL-MCNC: 104 MG/DL (ref 65–99)
HCT VFR BLD AUTO: 43.2 % (ref 37.5–51)
HGB BLD-MCNC: 15.2 G/DL (ref 13–17.7)
IMM GRANULOCYTES # BLD AUTO: 0.07 10*3/MM3 (ref 0–0.05)
IMM GRANULOCYTES NFR BLD AUTO: 0.6 % (ref 0–0.5)
LYMPHOCYTES # BLD AUTO: 1.35 10*3/MM3 (ref 0.7–3.1)
LYMPHOCYTES NFR BLD AUTO: 12.4 % (ref 19.6–45.3)
MCH RBC QN AUTO: 31.1 PG (ref 26.6–33)
MCHC RBC AUTO-ENTMCNC: 35.2 G/DL (ref 31.5–35.7)
MCV RBC AUTO: 88.5 FL (ref 79–97)
MONOCYTES # BLD AUTO: 0.31 10*3/MM3 (ref 0.1–0.9)
MONOCYTES NFR BLD AUTO: 2.9 % (ref 5–12)
NEUTROPHILS NFR BLD AUTO: 83.7 % (ref 42.7–76)
NEUTROPHILS NFR BLD AUTO: 9.08 10*3/MM3 (ref 1.7–7)
NRBC BLD AUTO-RTO: 0 /100 WBC (ref 0–0.2)
PLATELET # BLD AUTO: 295 10*3/MM3 (ref 140–450)
PMV BLD AUTO: 10.7 FL (ref 6–12)
POTASSIUM SERPL-SCNC: 4.9 MMOL/L (ref 3.5–5.2)
PROT SERPL-MCNC: 7.7 G/DL (ref 6–8.5)
RBC # BLD AUTO: 4.88 10*6/MM3 (ref 4.14–5.8)
SODIUM SERPL-SCNC: 140 MMOL/L (ref 136–145)
WBC # BLD AUTO: 10.85 10*3/MM3 (ref 3.4–10.8)

## 2020-11-12 ENCOUNTER — LAB (OUTPATIENT)
Dept: LAB | Facility: HOSPITAL | Age: 50
End: 2020-11-12

## 2020-11-12 ENCOUNTER — OFFICE VISIT (OUTPATIENT)
Dept: FAMILY MEDICINE CLINIC | Facility: CLINIC | Age: 50
End: 2020-11-12

## 2020-11-12 VITALS
RESPIRATION RATE: 20 BRPM | BODY MASS INDEX: 40.14 KG/M2 | HEIGHT: 72 IN | WEIGHT: 296.4 LBS | SYSTOLIC BLOOD PRESSURE: 158 MMHG | HEART RATE: 88 BPM | OXYGEN SATURATION: 98 % | DIASTOLIC BLOOD PRESSURE: 98 MMHG

## 2020-11-12 DIAGNOSIS — K92.1 BLOOD IN STOOL: ICD-10-CM

## 2020-11-12 DIAGNOSIS — M54.50 CHRONIC LOW BACK PAIN, UNSPECIFIED BACK PAIN LATERALITY, UNSPECIFIED WHETHER SCIATICA PRESENT: Primary | ICD-10-CM

## 2020-11-12 DIAGNOSIS — G89.29 CHRONIC LOW BACK PAIN, UNSPECIFIED BACK PAIN LATERALITY, UNSPECIFIED WHETHER SCIATICA PRESENT: Primary | ICD-10-CM

## 2020-11-12 PROCEDURE — 36415 COLL VENOUS BLD VENIPUNCTURE: CPT

## 2020-11-12 PROCEDURE — 85025 COMPLETE CBC W/AUTO DIFF WBC: CPT

## 2020-11-12 PROCEDURE — 99214 OFFICE O/P EST MOD 30 MIN: CPT | Performed by: FAMILY MEDICINE

## 2020-11-12 RX ORDER — CELECOXIB 100 MG/1
100 CAPSULE ORAL 2 TIMES DAILY
Qty: 60 CAPSULE | Refills: 1 | Status: SHIPPED | OUTPATIENT
Start: 2020-11-12 | End: 2021-01-07

## 2020-11-12 NOTE — PROGRESS NOTES
Subjective   Venkatesh Grimes is a 50 y.o. male.   Cc:back pain and blood in stool  HPI  The patient comes back for check of his back. He is having back pain still. It is better than it was. He had blood in his stool two months ago. He hasn't had any more blood in his stool.His abdomen has not been cramping as much since adding the Bentyl.  The following portions of the patient's history were reviewed and updated as appropriate: allergies, current medications, past family history, past medical history, past social history, past surgical history and problem list.    Review of Systems   Constitutional: Negative for fatigue and fever.   Respiratory: Negative for cough, chest tightness and stridor.    Cardiovascular: Negative for chest pain, palpitations and leg swelling.   Gastrointestinal: Negative for abdominal distention, abdominal pain and blood in stool.       Objective   Physical Exam  Vitals signs reviewed.   Constitutional:       Appearance: Normal appearance.   HENT:      Head: Normocephalic and atraumatic.      Right Ear: Tympanic membrane normal.      Left Ear: Tympanic membrane normal.      Nose: Nose normal.      Mouth/Throat:      Mouth: Mucous membranes are moist.      Pharynx: Oropharynx is clear.   Eyes:      Extraocular Movements: Extraocular movements intact.      Pupils: Pupils are equal, round, and reactive to light.   Neck:      Musculoskeletal: Normal range of motion and neck supple.   Cardiovascular:      Rate and Rhythm: Normal rate and regular rhythm.      Heart sounds: Normal heart sounds. No murmur. No friction rub. No gallop.    Pulmonary:      Effort: Pulmonary effort is normal. No respiratory distress.      Breath sounds: Normal breath sounds. No stridor. No wheezing, rhonchi or rales.   Chest:      Chest wall: No tenderness.   Abdominal:      General: Abdomen is flat. Bowel sounds are normal. There is no distension.      Palpations: Abdomen is soft.      Tenderness: There is no  "abdominal tenderness.   Genitourinary:     Comments: Normal sphincter tone.Prostate was without disparity,nodularityor induration. Non tender. Heme negative brown stool.  Neurological:      Mental Status: He is alert.           Visit Vitals  /98 (BP Location: Left arm, Patient Position: Sitting, Cuff Size: Adult)   Pulse 88   Resp 20   Ht 182.9 cm (72\")   Wt 134 kg (296 lb 6.4 oz)   SpO2 98%   BMI 40.20 kg/m²     Body mass index is 40.2 kg/m².      Assessment/Plan   Diagnoses and all orders for this visit:    1. Chronic low back pain, unspecified back pain laterality, unspecified whether sciatica present (Primary)    2. Blood in stool  -     CBC w AUTO Differential; Future  -     Ambulatory Referral to Gastroenterology    Other orders  -     celecoxib (CeleBREX) 100 MG capsule; Take 1 capsule by mouth 2 (Two) Times a Day.  Dispense: 60 capsule; Refill: 1    Have referred to Zach Denson for further evaluation and possible consideration of repeat Colonoscopy.  Return to the clinic in 2 month/s.  Will contact with results as needed.    "

## 2020-11-13 LAB
BASOPHILS # BLD AUTO: 0.06 10*3/MM3 (ref 0–0.2)
BASOPHILS NFR BLD AUTO: 0.7 % (ref 0–1.5)
DEPRECATED RDW RBC AUTO: 41.8 FL (ref 37–54)
EOSINOPHIL # BLD AUTO: 0.28 10*3/MM3 (ref 0–0.4)
EOSINOPHIL NFR BLD AUTO: 3.4 % (ref 0.3–6.2)
ERYTHROCYTE [DISTWIDTH] IN BLOOD BY AUTOMATED COUNT: 12.8 % (ref 12.3–15.4)
HCT VFR BLD AUTO: 44 % (ref 37.5–51)
HGB BLD-MCNC: 15.2 G/DL (ref 13–17.7)
IMM GRANULOCYTES # BLD AUTO: 0.03 10*3/MM3 (ref 0–0.05)
IMM GRANULOCYTES NFR BLD AUTO: 0.4 % (ref 0–0.5)
LYMPHOCYTES # BLD AUTO: 2.76 10*3/MM3 (ref 0.7–3.1)
LYMPHOCYTES NFR BLD AUTO: 34 % (ref 19.6–45.3)
MCH RBC QN AUTO: 31.2 PG (ref 26.6–33)
MCHC RBC AUTO-ENTMCNC: 34.5 G/DL (ref 31.5–35.7)
MCV RBC AUTO: 90.3 FL (ref 79–97)
MONOCYTES # BLD AUTO: 0.64 10*3/MM3 (ref 0.1–0.9)
MONOCYTES NFR BLD AUTO: 7.9 % (ref 5–12)
NEUTROPHILS NFR BLD AUTO: 4.35 10*3/MM3 (ref 1.7–7)
NEUTROPHILS NFR BLD AUTO: 53.6 % (ref 42.7–76)
NRBC BLD AUTO-RTO: 0 /100 WBC (ref 0–0.2)
PLATELET # BLD AUTO: 268 10*3/MM3 (ref 140–450)
PMV BLD AUTO: 10.5 FL (ref 6–12)
RBC # BLD AUTO: 4.87 10*6/MM3 (ref 4.14–5.8)
WBC # BLD AUTO: 8.12 10*3/MM3 (ref 3.4–10.8)

## 2020-11-19 ENCOUNTER — OFFICE VISIT (OUTPATIENT)
Dept: GASTROENTEROLOGY | Facility: CLINIC | Age: 50
End: 2020-11-19

## 2020-11-19 VITALS
DIASTOLIC BLOOD PRESSURE: 103 MMHG | HEART RATE: 82 BPM | HEIGHT: 72 IN | BODY MASS INDEX: 40.39 KG/M2 | WEIGHT: 298.2 LBS | SYSTOLIC BLOOD PRESSURE: 157 MMHG

## 2020-11-19 DIAGNOSIS — K62.5 HEMORRHAGE OF ANUS AND RECTUM: ICD-10-CM

## 2020-11-19 DIAGNOSIS — R13.10 DYSPHAGIA, UNSPECIFIED TYPE: ICD-10-CM

## 2020-11-19 DIAGNOSIS — K21.00 GASTROESOPHAGEAL REFLUX DISEASE WITH ESOPHAGITIS WITHOUT HEMORRHAGE: ICD-10-CM

## 2020-11-19 DIAGNOSIS — R10.84 GENERALIZED ABDOMINAL PAIN: Primary | ICD-10-CM

## 2020-11-19 DIAGNOSIS — K58.0 IRRITABLE BOWEL SYNDROME WITH DIARRHEA: ICD-10-CM

## 2020-11-19 PROCEDURE — 99214 OFFICE O/P EST MOD 30 MIN: CPT | Performed by: PHYSICIAN ASSISTANT

## 2020-11-19 RX ORDER — PANTOPRAZOLE SODIUM 40 MG/1
40 TABLET, DELAYED RELEASE ORAL DAILY
Qty: 30 TABLET | Refills: 3 | Status: SHIPPED | OUTPATIENT
Start: 2020-11-19 | End: 2021-01-12 | Stop reason: SDUPTHER

## 2020-11-25 ENCOUNTER — DOCUMENTATION (OUTPATIENT)
Dept: FAMILY MEDICINE CLINIC | Facility: CLINIC | Age: 50
End: 2020-11-25

## 2021-01-07 ENCOUNTER — OFFICE VISIT (OUTPATIENT)
Dept: GASTROENTEROLOGY | Facility: CLINIC | Age: 51
End: 2021-01-07

## 2021-01-07 VITALS
SYSTOLIC BLOOD PRESSURE: 134 MMHG | HEIGHT: 72 IN | DIASTOLIC BLOOD PRESSURE: 82 MMHG | WEIGHT: 299 LBS | HEART RATE: 91 BPM | BODY MASS INDEX: 40.5 KG/M2

## 2021-01-07 DIAGNOSIS — R10.84 GENERALIZED ABDOMINAL PAIN: Primary | ICD-10-CM

## 2021-01-07 DIAGNOSIS — K58.0 IRRITABLE BOWEL SYNDROME WITH DIARRHEA: ICD-10-CM

## 2021-01-07 DIAGNOSIS — R13.10 DYSPHAGIA, UNSPECIFIED TYPE: ICD-10-CM

## 2021-01-07 DIAGNOSIS — K21.00 GASTROESOPHAGEAL REFLUX DISEASE WITH ESOPHAGITIS WITHOUT HEMORRHAGE: ICD-10-CM

## 2021-01-07 PROCEDURE — 99214 OFFICE O/P EST MOD 30 MIN: CPT | Performed by: PHYSICIAN ASSISTANT

## 2021-01-07 RX ORDER — DEXTROSE AND SODIUM CHLORIDE 5; .45 G/100ML; G/100ML
30 INJECTION, SOLUTION INTRAVENOUS CONTINUOUS PRN
Status: CANCELLED | OUTPATIENT
Start: 2021-01-07

## 2021-01-07 RX ORDER — SODIUM, POTASSIUM,MAG SULFATES 17.5-3.13G
1 SOLUTION, RECONSTITUTED, ORAL ORAL ONCE
Qty: 1 BOTTLE | Refills: 0 | Status: SHIPPED | OUTPATIENT
Start: 2021-01-07 | End: 2021-01-07

## 2021-01-07 NOTE — PATIENT INSTRUCTIONS
"BMI for Adults  What is BMI?  Body mass index (BMI) is a number that is calculated from a person's weight and height. BMI can help estimate how much of a person's weight is composed of fat. BMI does not measure body fat directly. Rather, it is an alternative to procedures that directly measure body fat, which can be difficult and expensive.  BMI can help identify people who may be at higher risk for certain medical problems.  What are BMI measurements used for?  BMI is used as a screening tool to identify possible weight problems. It helps determine whether a person is obese, overweight, a healthy weight, or underweight.  BMI is useful for:  · Identifying a weight problem that may be related to a medical condition or may increase the risk for medical problems.  · Promoting changes, such as changes in diet and exercise, to help reach a healthy weight. BMI screening can be repeated to see if these changes are working.  How is BMI calculated?  BMI involves measuring your weight in relation to your height. Both height and weight are measured, and the BMI is calculated from those numbers. This can be done either in English (U[place] or metric measurements. Note that charts and online BMI calculators are available to help you find your BMI quickly and easily without having to do these calculations yourself.  To calculate your BMI in English (U.S[place] measurements:    1. Measure your weight in pounds (lb).  2. Multiply the number of pounds by 703.  ? For example, for a person who weighs 180 lb, multiply that number by 703, which equals 126,540.  3. Measure your height in inches. Then multiply that number by itself to get a measurement called \"inches squared.\"  ? For example, for a person who is 70 inches tall, the \"inches squared\" measurement is 70 inches x 70 inches, which equals 4,900 inches squared.  4. Divide the total from step 2 (number of lb x 703) by the total from step 3 (inches squared): 126,540 ÷ 4,900 = 25.8. This is " "your BMI.  To calculate your BMI in metric measurements:  1. Measure your weight in kilograms (kg).  2. Measure your height in meters (m). Then multiply that number by itself to get a measurement called \"meters squared.\"  ? For example, for a person who is 1.75 m tall, the \"meters squared\" measurement is 1.75 m x 1.75 m, which is equal to 3.1 meters squared.  3. Divide the number of kilograms (your weight) by the meters squared number. In this example: 70 ÷ 3.1 = 22.6. This is your BMI.  What do the results mean?  BMI charts are used to identify whether you are underweight, normal weight, overweight, or obese. The following guidelines will be used:  · Underweight: BMI less than 18.5.  · Normal weight: BMI between 18.5 and 24.9.  · Overweight: BMI between 25 and 29.9.  · Obese: BMI of 30 or above.  Keep these notes in mind:  · Weight includes both fat and muscle, so someone with a muscular build, such as an athlete, may have a BMI that is higher than 24.9. In cases like these, BMI is not an accurate measure of body fat.  · To determine if excess body fat is the cause of a BMI of 25 or higher, further assessments may need to be done by a health care provider.  · BMI is usually interpreted in the same way for men and women.  Where to find more information  For more information about BMI, including tools to quickly calculate your BMI, go to these websites:  · Centers for Disease Control and Prevention: www.cdc.gov  · American Heart Association: www.heart.org  · National Heart, Lung, and Blood [place]: www.nhlbi.nih.gov  Summary  · Body mass index (BMI) is a number that is calculated from a person's weight and height.  · BMI may help estimate how much of a person's weight is composed of fat. BMI can help identify those who may be at higher risk for certain medical problems.  · BMI can be measured using English measurements or metric measurements.  · BMI charts are used to identify whether you are underweight, normal " weight, overweight, or obese.  This information is not intended to replace advice given to you by your health care provider. Make sure you discuss any questions you have with your health care provider.  Document Revised: 09/09/2020 Document Reviewed: 07/17/2020  Elsevier Patient Education © 2020 [employer]

## 2021-01-07 NOTE — PROGRESS NOTES
Chief Complaint   Patient presents with   • Irritable Bowel Syndrome   • Heartburn   • Difficulty Swallowing       ENDO PROCEDURE ORDERED: EGDw/dilation gerd, dysphagia COLON diarrhea, abd pain    Subjective    Venkatesh Grimes is a 50 y.o. male. he is here today for follow-up.    History of Present Illness    Patient is seen on a recheck of his GERD, IBS, dysphagia. Last seen on 11/19/2020. He did complete the Xifaxan. He is on Protonix. It has helped his heartburn but he is still having dysphagia primarily to solids. He states he started to take a probiotic. He has been taking some Bentyl 20 mg daily. He states he had another flare with his bowels. He is having more cramping and diarrhea. No blood in his stool. Weight is up less than 1 pound since last visit. Last colonoscopy showed a polyp on 10/03/2018. Last EGD with esophageal stricture on 01/11/2018.     ASSESSMENT/PLAN:  Patient with persistent GERD at risk for Jay’s with persistent dysphagia. Suspect recurrent peptic stricture. Recommend EGD with dilatation. We will do biopsies as indicated. Recurrent bouts of diarrhea, abdominal pain with previous colon polyp. Recommend colonoscopy. We will do biopsies as indicated. We will plan further pending clinical course and the results of the above. The patient was agreeable.      The following portions of the patient's history were reviewed and updated as appropriate:   Past Medical History:   Diagnosis Date   • CTS (carpal tunnel syndrome)    • Dysphagia    • GERD (gastroesophageal reflux disease)    • Hyperlipidemia    • IBS (irritable bowel syndrome)    • Other obesity    • Tobacco dependence syndrome      Past Surgical History:   Procedure Laterality Date   • CARPAL TUNNEL RELEASE     • COLONOSCOPY     • COLONOSCOPY N/A 10/3/2018    Procedure: COLONOSCOPY;  Surgeon: Vijay Rodriguez MD;  Location: [facility] ENDOSCOPY;  Service: Gastroenterology   • ECHO - CONVERTED  06/10/2016    Ef 55-60%.Impiared LV  relaxation. [facility]   • ENDOSCOPY N/A 1/11/2018    Procedure: ESOPHAGOGASTRODUODENOSCOPY possible dilation;  Surgeon: Vijay Rodriguez MD;  Location: [facility] ENDOSCOPY;  Service:    • HAND SURGERY  11/06/2012    Excision. Osteochondroma & exostosis of right index finger of proximal phalanx   • ROTATOR CUFF REPAIR     • TONSILLECTOMY     • TONSILLECTOMY     • UPPER GASTROINTESTINAL ENDOSCOPY  01/11/2018     Family History   Problem Relation Age of Onset   • Diabetes Mother    • Heart disease Mother         ischemic   • Hypertension Mother    • Heart failure Mother         congestive   • Hypertension Father    • Diabetes Father    • Stroke Father    • Heart disease Father    • Cancer Father 73        tenal cell carcinoma.   • Hypertension Sister    • Diabetes Sister    • Thyroid cancer Sister    • Cancer Sister 46        bladder cancer   • Heart disease Brother    • Hypertension Brother    • Leukemia Other 46        sister   • Cancer Paternal Grandmother        Allergies   Allergen Reactions   • Shellfish-Derived Products Swelling     Social History     Socioeconomic History   • Marital status:      Spouse name: Not on file   • Number of children: Not on file   • Years of education: Not on file   • Highest education level: Not on file   Tobacco Use   • Smoking status: Former Smoker     Packs/day: 1.00     Types: Cigarettes   • Smokeless tobacco: Former User     Types: Chew   Substance and Sexual Activity   • Alcohol use: Yes     Comment: occasional    • Drug use: No   • Sexual activity: Defer     Current Medications:  Prior to Admission medications    Medication Sig Start Date End Date Taking? Authorizing Provider   dicyclomine (Bentyl) 10 MG capsule Take 1 capsule by mouth 4 (Four) Times a Day Before Meals & at Bedtime. 10/26/20  Yes Nikolas Mcclain MD   pantoprazole (Protonix) 40 MG EC tablet Take 1 tablet by mouth Daily. 11/19/20  Yes Zach Denson PA-C   celecoxib (CeleBREX) 100 MG  "capsule Take 1 capsule by mouth 2 (Two) Times a Day. 11/12/20 1/7/21  Nikolas Mcclain MD   methocarbamol (Robaxin) 500 MG tablet Take 1 tablet by mouth 4 (Four) Times a Day. 10/26/20 1/7/21  Nikolas Mcclain MD   predniSONE (DELTASONE) 10 MG tablet 4 daily times three days, 3 daily times three days, 2 daily times three days,1 daily times three days 10/26/20 1/7/21  Nikolas Mcclain MD   riFAXIMin (Xifaxan) 550 MG tablet Take 1 tablet by mouth 3 (Three) Times a Day. 11/19/20 1/7/21  Zach Denson PA-C     Review of Systems  Review of Systems   Constitutional: Negative for unexpected weight change.   HENT: Positive for trouble swallowing.    Gastrointestinal: Positive for abdominal pain, diarrhea and nausea. Negative for anal bleeding, blood in stool, constipation, rectal pain and vomiting.          Objective    /82   Pulse 91   Ht 182.9 cm (72\")   Wt 136 kg (299 lb)   BMI 40.55 kg/m²   Physical Exam  Vitals signs and nursing note reviewed.   Constitutional:       General: He is not in acute distress.     Appearance: He is well-developed.   HENT:      Head: Normocephalic and atraumatic.   Eyes:      Pupils: Pupils are equal, round, and reactive to light.   Neck:      Musculoskeletal: Normal range of motion.   Cardiovascular:      Rate and Rhythm: Normal rate and regular rhythm.      Heart sounds: Normal heart sounds.   Pulmonary:      Effort: Pulmonary effort is normal.      Breath sounds: Normal breath sounds.   Abdominal:      General: Bowel sounds are normal. There is no distension or abdominal bruit.      Palpations: Abdomen is soft. Abdomen is not rigid. There is no shifting dullness or mass.      Tenderness: There is abdominal tenderness. There is no guarding or rebound.      Hernia: No hernia is present. There is no hernia in the ventral area.      Comments: Mild diffuse   Musculoskeletal: Normal range of motion.   Skin:     General: Skin is warm and dry.   Neurological:      Mental " Status: He is alert and oriented to person, place, and time.   Psychiatric:         Behavior: Behavior normal.         Thought Content: Thought content normal.         Judgment: Judgment normal.       Assessment/Plan      1. Generalized abdominal pain    2. Gastroesophageal reflux disease with esophagitis without hemorrhage    3. Irritable bowel syndrome with diarrhea    4. Dysphagia, unspecified type    .   Diagnoses and all orders for this visit:    1. Generalized abdominal pain (Primary)  -     Case Request; Standing  -     dextrose 5 % and sodium chloride 0.45 % infusion  -     Case Request    2. Gastroesophageal reflux disease with esophagitis without hemorrhage  -     Case Request; Standing  -     dextrose 5 % and sodium chloride 0.45 % infusion  -     Case Request    3. Irritable bowel syndrome with diarrhea  -     Case Request; Standing  -     dextrose 5 % and sodium chloride 0.45 % infusion  -     Case Request    4. Dysphagia, unspecified type  -     Case Request; Standing  -     dextrose 5 % and sodium chloride 0.45 % infusion  -     Case Request    Other orders  -     Follow Anesthesia Guidelines / Standing Orders; Future  -     Obtain Informed Consent; Future  -     Obtain Informed Consent; Standing  -     POC Glucose Once; Standing  -     sodium-potassium-magnesium sulfates (Suprep Bowel Prep Kit) 17.5-3.13-1.6 GM/177ML solution oral solution; Take 1 bottle by mouth 1 (One) Time for 1 dose. Take as per instruction sheet for colonoscopy prep.  Dispense: 1 bottle; Refill: 0        Orders placed during this encounter include:  Orders Placed This Encounter   Procedures   • Follow Anesthesia Guidelines / Standing Orders     Standing Status:   Future   • Obtain Informed Consent     Standing Status:   Future     Order Specific Question:   Informed Consent Given For     Answer:   ESOPHAGOGASTRODUODENOSCOPY and colonoscopy       Medications prescribed:  New Medications Ordered This Visit   Medications   •  sodium-potassium-magnesium sulfates (Suprep Bowel Prep Kit) 17.5-3.13-1.6 GM/177ML solution oral solution     Sig: Take 1 bottle by mouth 1 (One) Time for 1 dose. Take as per instruction sheet for colonoscopy prep.     Dispense:  1 bottle     Refill:  0     Discontinued Medications       Reason for Discontinue     celecoxib (CeleBREX) 100 MG capsule    *Therapy completed     methocarbamol (Robaxin) 500 MG tablet    *Therapy completed     predniSONE (DELTASONE) 10 MG tablet    *Therapy completed     riFAXIMin (Xifaxan) 550 MG tablet    *Therapy completed        Requested Prescriptions     Signed Prescriptions Disp Refills   • sodium-potassium-magnesium sulfates (Suprep Bowel Prep Kit) 17.5-3.13-1.6 GM/177ML solution oral solution 1 bottle 0     Sig: Take 1 bottle by mouth 1 (One) Time for 1 dose. Take as per instruction sheet for colonoscopy prep.       Review and/or summary of lab tests, radiology, procedures, medications. Review and summary of old records and obtaining of history. The risks and benefits of my recommendations, as well as other treatment options were discussed with the patient today. Questions were answered.    Follow-up: Return in about 1 month (around 2/7/2021), or if symptoms worsen or fail to improve.     ESOPHAGOGASTRODUODENOSCOPY--with dilation, (N/A), COLONOSCOPY (N/A)      This document has been electronically signed by Zach Denson PA-C on January 18, 2021 19:06 CST      Results for orders placed or performed in visit on 11/12/20   CBC Auto Differential    Specimen: Blood   Result Value Ref Range    WBC 8.12 3.40 - 10.80 10*3/mm3    RBC 4.87 4.14 - 5.80 10*6/mm3    Hemoglobin 15.2 13.0 - 17.7 g/dL    Hematocrit 44.0 37.5 - 51.0 %    MCV 90.3 79.0 - 97.0 fL    MCH 31.2 26.6 - 33.0 pg    MCHC 34.5 31.5 - 35.7 g/dL    RDW 12.8 12.3 - 15.4 %    RDW-SD 41.8 37.0 - 54.0 fl    MPV 10.5 6.0 - 12.0 fL    Platelets 268 140 - 450 10*3/mm3    Neutrophil % 53.6 42.7 - 76.0 %    Lymphocyte % 34.0  19.6 - 45.3 %    Monocyte % 7.9 5.0 - 12.0 %    Eosinophil % 3.4 0.3 - 6.2 %    Basophil % 0.7 0.0 - 1.5 %    Immature Grans % 0.4 0.0 - 0.5 %    Neutrophils, Absolute 4.35 1.70 - 7.00 10*3/mm3    Lymphocytes, Absolute 2.76 0.70 - 3.10 10*3/mm3    Monocytes, Absolute 0.64 0.10 - 0.90 10*3/mm3    Eosinophils, Absolute 0.28 0.00 - 0.40 10*3/mm3    Basophils, Absolute 0.06 0.00 - 0.20 10*3/mm3    Immature Grans, Absolute 0.03 0.00 - 0.05 10*3/mm3    nRBC 0.0 0.0 - 0.2 /100 WBC   Results for orders placed or performed in visit on 10/27/20   CBC Auto Differential    Specimen: Blood   Result Value Ref Range    WBC 10.85 (H) 3.40 - 10.80 10*3/mm3    RBC 4.88 4.14 - 5.80 10*6/mm3    Hemoglobin 15.2 13.0 - 17.7 g/dL    Hematocrit 43.2 37.5 - 51.0 %    MCV 88.5 79.0 - 97.0 fL    MCH 31.1 26.6 - 33.0 pg    MCHC 35.2 31.5 - 35.7 g/dL    RDW 12.4 12.3 - 15.4 %    RDW-SD 40.1 37.0 - 54.0 fl    MPV 10.7 6.0 - 12.0 fL    Platelets 295 140 - 450 10*3/mm3    Neutrophil % 83.7 (H) 42.7 - 76.0 %    Lymphocyte % 12.4 (L) 19.6 - 45.3 %    Monocyte % 2.9 (L) 5.0 - 12.0 %    Eosinophil % 0.1 (L) 0.3 - 6.2 %    Basophil % 0.3 0.0 - 1.5 %    Immature Grans % 0.6 (H) 0.0 - 0.5 %    Neutrophils, Absolute 9.08 (H) 1.70 - 7.00 10*3/mm3    Lymphocytes, Absolute 1.35 0.70 - 3.10 10*3/mm3    Monocytes, Absolute 0.31 0.10 - 0.90 10*3/mm3    Eosinophils, Absolute 0.01 0.00 - 0.40 10*3/mm3    Basophils, Absolute 0.03 0.00 - 0.20 10*3/mm3    Immature Grans, Absolute 0.07 (H) 0.00 - 0.05 10*3/mm3    nRBC 0.0 0.0 - 0.2 /100 WBC   Comprehensive metabolic panel    Specimen: Blood   Result Value Ref Range    Glucose 104 (H) 65 - 99 mg/dL    BUN 13 6 - 20 mg/dL    Creatinine 0.94 0.76 - 1.27 mg/dL    Sodium 140 136 - 145 mmol/L    Potassium 4.9 3.5 - 5.2 mmol/L    Chloride 102 98 - 107 mmol/L    CO2 25.9 22.0 - 29.0 mmol/L    Calcium 10.3 8.6 - 10.5 mg/dL    Total Protein 7.7 6.0 - 8.5 g/dL    Albumin 5.20 3.50 - 5.20 g/dL    ALT (SGPT) 35 1 - 41 U/L    AST  "(SGOT) 27 1 - 40 U/L    Alkaline Phosphatase 68 39 - 117 U/L    Total Bilirubin 0.4 0.0 - 1.2 mg/dL    eGFR Non African Amer 86 >60 mL/min/1.73    Globulin 2.5 gm/dL    A/G Ratio 2.1 g/dL    BUN/Creatinine Ratio 13.8 7.0 - 25.0    Anion Gap 12.1 5.0 - 15.0 mmol/L   Results for orders placed or performed in visit on 10/25/18   IBD Sgi Diagnostic    Specimen: Blood   Result Value Ref Range    Specimen Status Comment    Results for orders placed or performed during the hospital encounter of 10/03/18   Tissue Pathology Exam    Specimen: A: Large Intestine; Tissue    B: Large Intestine, Sigmoid Colon; Polyp   Result Value Ref Range    Case Report       Surgical Pathology Report                         Case: [id number]                                  Authorizing Provider:  Vijay Rodriguez MD        Collected:           10/03/2018 03:31 PM          Ordering Location:     [facility]             Received:            10/04/2018 11:14 AM                                 [facility]                                                     Pathologist:           Zaheer Jose MD                                                           Specimens:   1) - Large Intestine, colonic mucosa                                                                2) - Large Intestine, Sigmoid Colon, polyp                                                 Final Diagnosis       1.  COLONIC MUCOSA, RANDOM BIOPSY:  FOCAL ACTIVE COLITIS.    2.  SIGMOID COLON, BIOPSY:  HYPERPLASTIC POLYP.      Comment       The colonic mucosa shows focal cryptitis, most consistent with acute self limited colitis/infectious colitis.  Other entities, including Crohn's disease and C. difficile associated colitis, seem less likely but cannot be ruled out with certainty.  Clinical correlation is recommended.      Gross Description       1.  The first specimen is labeled \"CM\" and consists of 3 tan fragments measuring 0.8 x 0.7 x 0.2 cm together.  Totally " "submitted.    2.  The second specimen is labeled \"Sig P\" and  consists of 2 tan fragments measuring 0.5 x 0.3 x 0.2 cm together.  Totally submitted.     Results for orders placed or performed during the hospital encounter of 01/11/18   Tissue Pathology Exam - Tissue, Gastric, Antrum    Specimen: A: Gastric, Antrum; Tissue    B: Esophagus, Distal; Tissue   Result Value Ref Range    Case Report       Surgical Pathology Report                         Case: [id number]                                  Authorizing Provider:  Vijay Rodriguez MD         Collected:           01/11/2018 05:03 PM          Ordering Location:     [facility]             Received:            01/12/2018 07:49 AM                                 [place]                                                     Pathologist:           Zaheer Jose MD                                                            Specimens:   1) - Gastric, Antrum                                                                                2) - Esophagus, Distal                                                                     Final Diagnosis       1.  GASTRIC ANTRUM, BIOPSY:  REACTIVE GASTROPATHY.    2.  DISTAL ESOPHAGUS, BIOPSY:  BENIGN SQUAMOUS EPITHELIUM.      Gross Description       1.  Two tan fragments measure 0.6 x 0.4 x 0.2 cm together.  Totally submitted.      2.  One gray fragment measures 0.5 x 0.5 x 0.1 cm.  Totally submitted.        Embedded Images     Results for orders placed or performed in visit on 08/01/17   Lipid Panel With LDL / HDL Ratio    Specimen: Blood   Result Value Ref Range    Total Cholesterol 227 (H) 0 - 199 mg/dL    Triglycerides 155 20 - 199 mg/dL    HDL Cholesterol 58 (L) 60 - 200 mg/dL    LDL Cholesterol  138 (H) 0 - 129 mg/dL    VLDL Cholesterol 31 mg/dL    LDL/HDL Ratio 2.38 0.00 - 3.55   CBC (No Diff)    Specimen: Blood   Result Value Ref Range    WBC 6.97 3.20 - 9.80 10*3/mm3    RBC 5.10 4.37 - 5.74 10*6/mm3    " Hemoglobin 15.7 13.7 - 17.3 g/dL    Hematocrit 45.5 39.0 - 49.0 %    MCV 89.2 80.0 - 98.0 fL    MCH 30.8 26.5 - 34.0 pg    MCHC 34.5 31.5 - 36.3 g/dL    RDW 12.4 11.5 - 14.5 %    RDW-SD 40.0 35.1 - 43.9 fl    MPV 10.8 8.0 - 12.0 fL    Platelets 318 150 - 450 10*3/mm3   TSH    Specimen: Blood   Result Value Ref Range    TSH 3.480 0.460 - 4.680 mIU/mL   T4, Free    Specimen: Blood   Result Value Ref Range    Free T4 1.09 0.78 - 2.19 ng/dL   Iron    Specimen: Blood   Result Value Ref Range    Iron 71 49 - 181 mcg/dL   Hemoglobin A1c    Specimen: Blood   Result Value Ref Range    Hemoglobin A1C 5.4 4 - 5.6 %   Vitamin B12    Specimen: Blood   Result Value Ref Range    Vitamin B-12 517 239 - 931 pg/mL   Comprehensive Metabolic Panel    Specimen: Blood   Result Value Ref Range    Glucose 86 60 - 100 mg/dL    BUN 17 7 - 21 mg/dL    Creatinine 0.99 0.70 - 1.30 mg/dL    Sodium 138 137 - 145 mmol/L    Potassium 3.9 3.5 - 5.1 mmol/L    Chloride 100 95 - 110 mmol/L    CO2 26.0 22.0 - 31.0 mmol/L    Calcium 10.1 8.4 - 10.2 mg/dL    Total Protein 7.9 6.3 - 8.6 g/dL    Albumin 5.10 (H) 3.40 - 4.80 g/dL    ALT (SGPT) 44 21 - 72 U/L    AST (SGOT) 56 17 - 59 U/L    Alkaline Phosphatase 77 38 - 126 U/L    Total Bilirubin 0.6 0.2 - 1.3 mg/dL    eGFR Non  Amer 82 63 - 147 mL/min/1.73    Globulin 2.8 2.3 - 3.5 gm/dL    A/G Ratio 1.8 1.1 - 1.8 g/dL    BUN/Creatinine Ratio 17.2 7.0 - 25.0    Anion Gap 12.0 5.0 - 15.0 mmol/L     *Note: Due to a large number of results and/or encounters for the requested time period, some results have not been displayed. A complete set of results can be found in Results Review.       Some portions of this note have been dictated using voice recognition software and may contain errors and/or omissions.

## 2021-01-12 ENCOUNTER — OFFICE VISIT (OUTPATIENT)
Dept: FAMILY MEDICINE CLINIC | Facility: CLINIC | Age: 51
End: 2021-01-12

## 2021-01-12 VITALS
OXYGEN SATURATION: 98 % | HEART RATE: 76 BPM | BODY MASS INDEX: 40.51 KG/M2 | HEIGHT: 72 IN | WEIGHT: 299.13 LBS | SYSTOLIC BLOOD PRESSURE: 132 MMHG | DIASTOLIC BLOOD PRESSURE: 80 MMHG

## 2021-01-12 DIAGNOSIS — G89.29 CHRONIC MIDLINE LOW BACK PAIN WITHOUT SCIATICA: Primary | ICD-10-CM

## 2021-01-12 DIAGNOSIS — K21.9 GASTROESOPHAGEAL REFLUX DISEASE, UNSPECIFIED WHETHER ESOPHAGITIS PRESENT: ICD-10-CM

## 2021-01-12 DIAGNOSIS — M54.50 CHRONIC MIDLINE LOW BACK PAIN WITHOUT SCIATICA: Primary | ICD-10-CM

## 2021-01-12 PROCEDURE — 99214 OFFICE O/P EST MOD 30 MIN: CPT | Performed by: FAMILY MEDICINE

## 2021-01-12 RX ORDER — PANTOPRAZOLE SODIUM 40 MG/1
40 TABLET, DELAYED RELEASE ORAL DAILY
Qty: 30 TABLET | Refills: 3 | Status: SHIPPED | OUTPATIENT
Start: 2021-01-12 | End: 2021-12-06 | Stop reason: SDUPTHER

## 2021-01-12 RX ORDER — DICYCLOMINE HYDROCHLORIDE 10 MG/1
10 CAPSULE ORAL
Qty: 90 CAPSULE | Refills: 2 | Status: SHIPPED | OUTPATIENT
Start: 2021-01-12 | End: 2021-12-06 | Stop reason: SDUPTHER

## 2021-01-12 RX ORDER — DICYCLOMINE HYDROCHLORIDE 10 MG/1
10 CAPSULE ORAL
Qty: 90 CAPSULE | Refills: 2 | Status: SHIPPED | OUTPATIENT
Start: 2021-01-12 | End: 2021-01-12 | Stop reason: SDUPTHER

## 2021-01-12 NOTE — PROGRESS NOTES
Subjective   Venkatesh Grimes is a 50 y.o. male.   Cc: follow up of chronic medical issues    Back Pain  This is a chronic problem. The current episode started more than 1 year ago. The problem occurs daily. The problem is unchanged. The pain is present in the lumbar spine. The quality of the pain is described as aching. The pain does not radiate. The pain is at a severity of 4/10. The pain is mild. The symptoms are aggravated by standing and twisting. Associated symptoms include pelvic pain. Pertinent negatives include no abdominal pain, bladder incontinence, bowel incontinence, chest pain, dysuria, fever, leg pain, numbness, tingling or weight loss.   Heartburn  He complains of coughing, dysphagia, heartburn, a hoarse voice and a sore throat. He reports no abdominal pain, no belching, no chest pain, no choking, no early satiety, no nausea, no water brash or no wheezing. Pertinent negatives include no weight loss.       The following portions of the patient's history were reviewed and updated as appropriate: allergies, current medications, past family history, past medical history, past social history, past surgical history and problem list.    Review of Systems   Constitutional: Negative for fever and weight loss.   HENT: Positive for hoarse voice and sore throat.    Respiratory: Positive for cough. Negative for choking and wheezing.    Cardiovascular: Negative for chest pain.   Gastrointestinal: Positive for dysphagia and heartburn. Negative for abdominal pain, bowel incontinence and nausea.   Genitourinary: Positive for pelvic pain. Negative for bladder incontinence and dysuria.   Musculoskeletal: Positive for back pain.   Neurological: Negative for tingling and numbness.       Objective   Physical Exam  Vitals signs reviewed.   Constitutional:       Appearance: Normal appearance.   HENT:      Head: Normocephalic and atraumatic.      Right Ear: Tympanic membrane, ear canal and external ear normal.      Left  "Ear: Tympanic membrane, ear canal and external ear normal.      Nose: Nose normal.      Mouth/Throat:      Mouth: Mucous membranes are moist.      Pharynx: Oropharynx is clear.   Eyes:      Pupils: Pupils are equal, round, and reactive to light.   Neck:      Musculoskeletal: Normal range of motion and neck supple.   Cardiovascular:      Rate and Rhythm: Normal rate and regular rhythm.      Heart sounds: Normal heart sounds.   Pulmonary:      Effort: Pulmonary effort is normal. No respiratory distress.      Breath sounds: Normal breath sounds. No stridor. No wheezing, rhonchi or rales.   Chest:      Chest wall: No tenderness.   Abdominal:      General: Abdomen is flat. Bowel sounds are normal. There is no distension.      Palpations: Abdomen is soft. There is no mass.      Tenderness: There is no abdominal tenderness. There is no guarding or rebound.      Hernia: No hernia is present.   Musculoskeletal:      Comments: Tender on palpation of the low back.   Skin:     General: Skin is warm and dry.   Neurological:      Mental Status: He is alert.           Visit Vitals  /80   Pulse 76   Ht 182.9 cm (72\")   Wt 136 kg (299 lb 2 oz)   SpO2 98%   BMI 40.57 kg/m²     Body mass index is 40.57 kg/m².      Assessment/Plan   Diagnoses and all orders for this visit:    1. Chronic midline low back pain without sciatica (Primary)    2. Gastroesophageal reflux disease, unspecified whether esophagitis present  -     CBC w AUTO Differential; Future  -     Comprehensive metabolic panel; Future    Other orders  -     Discontinue: dicyclomine (Bentyl) 10 MG capsule; Take 1 capsule by mouth 4 (Four) Times a Day Before Meals & at Bedtime.  Dispense: 90 capsule; Refill: 2  -     pantoprazole (Protonix) 40 MG EC tablet; Take 1 tablet by mouth Daily.  Dispense: 30 tablet; Refill: 3  -     dicyclomine (Bentyl) 10 MG capsule; Take 1 capsule by mouth 4 (Four) Times a Day Before Meals & at Bedtime.  Dispense: 90 capsule; Refill: " 2    Return to the clinic in 3 month/s.  Will contact with results as needed.  Continue on current medications.

## 2021-09-26 ENCOUNTER — APPOINTMENT (OUTPATIENT)
Dept: CT IMAGING | Facility: HOSPITAL | Age: 51
End: 2021-09-26

## 2021-09-26 ENCOUNTER — HOSPITAL ENCOUNTER (EMERGENCY)
Facility: HOSPITAL | Age: 51
Discharge: HOME | End: 2021-09-26
Attending: EMERGENCY MEDICINE | Admitting: EMERGENCY MEDICINE

## 2021-09-26 VITALS
RESPIRATION RATE: 22 BRPM | BODY MASS INDEX: 38.6 KG/M2 | DIASTOLIC BLOOD PRESSURE: 104 MMHG | WEIGHT: 285 LBS | SYSTOLIC BLOOD PRESSURE: 168 MMHG | HEART RATE: 68 BPM | HEIGHT: 72 IN | TEMPERATURE: 98.2 F | OXYGEN SATURATION: 98 %

## 2021-09-26 DIAGNOSIS — N20.0 KIDNEY STONE: Primary | ICD-10-CM

## 2021-09-26 LAB
ALBUMIN SERPL-MCNC: 4.5 G/DL (ref 3.5–5.2)
ALBUMIN/GLOB SERPL: 1.8 G/DL
ALP SERPL-CCNC: 72 U/L (ref 39–117)
ALT SERPL W P-5'-P-CCNC: 21 U/L (ref 1–41)
ANION GAP SERPL CALCULATED.3IONS-SCNC: 12 MMOL/L (ref 5–15)
AST SERPL-CCNC: 19 U/L (ref 1–40)
BACTERIA UR QL AUTO: ABNORMAL /HPF
BASOPHILS # BLD AUTO: 0.05 10*3/MM3 (ref 0–0.2)
BASOPHILS NFR BLD AUTO: 0.7 % (ref 0–1.5)
BILIRUB SERPL-MCNC: 0.5 MG/DL (ref 0–1.2)
BILIRUB UR QL STRIP: NEGATIVE
BUN SERPL-MCNC: 19 MG/DL (ref 6–20)
BUN/CREAT SERPL: 18.4 (ref 7–25)
CALCIUM SPEC-SCNC: 9.3 MG/DL (ref 8.6–10.5)
CHLORIDE SERPL-SCNC: 106 MMOL/L (ref 98–107)
CLARITY UR: CLEAR
CO2 SERPL-SCNC: 22 MMOL/L (ref 22–29)
COLOR UR: YELLOW
CREAT SERPL-MCNC: 1.03 MG/DL (ref 0.76–1.27)
DEPRECATED RDW RBC AUTO: 40.7 FL (ref 37–54)
EOSINOPHIL # BLD AUTO: 0.24 10*3/MM3 (ref 0–0.4)
EOSINOPHIL NFR BLD AUTO: 3.3 % (ref 0.3–6.2)
ERYTHROCYTE [DISTWIDTH] IN BLOOD BY AUTOMATED COUNT: 12.6 % (ref 12.3–15.4)
GFR SERPL CREATININE-BSD FRML MDRD: 77 ML/MIN/1.73
GLOBULIN UR ELPH-MCNC: 2.5 GM/DL
GLUCOSE SERPL-MCNC: 146 MG/DL (ref 65–99)
GLUCOSE UR STRIP-MCNC: NEGATIVE MG/DL
HCT VFR BLD AUTO: 43.4 % (ref 37.5–51)
HGB BLD-MCNC: 15.5 G/DL (ref 13–17.7)
HGB UR QL STRIP.AUTO: NEGATIVE
HOLD SPECIMEN: NORMAL
HOLD SPECIMEN: NORMAL
HYALINE CASTS UR QL AUTO: ABNORMAL /LPF
IMM GRANULOCYTES # BLD AUTO: 0.03 10*3/MM3 (ref 0–0.05)
IMM GRANULOCYTES NFR BLD AUTO: 0.4 % (ref 0–0.5)
KETONES UR QL STRIP: NEGATIVE
LEUKOCYTE ESTERASE UR QL STRIP.AUTO: NEGATIVE
LIPASE SERPL-CCNC: 23 U/L (ref 13–60)
LYMPHOCYTES # BLD AUTO: 2.44 10*3/MM3 (ref 0.7–3.1)
LYMPHOCYTES NFR BLD AUTO: 33.9 % (ref 19.6–45.3)
MCH RBC QN AUTO: 31.6 PG (ref 26.6–33)
MCHC RBC AUTO-ENTMCNC: 35.7 G/DL (ref 31.5–35.7)
MCV RBC AUTO: 88.4 FL (ref 79–97)
MONOCYTES # BLD AUTO: 0.54 10*3/MM3 (ref 0.1–0.9)
MONOCYTES NFR BLD AUTO: 7.5 % (ref 5–12)
NEUTROPHILS NFR BLD AUTO: 3.89 10*3/MM3 (ref 1.7–7)
NEUTROPHILS NFR BLD AUTO: 54.2 % (ref 42.7–76)
NITRITE UR QL STRIP: NEGATIVE
NRBC BLD AUTO-RTO: 0 /100 WBC (ref 0–0.2)
PH UR STRIP.AUTO: <=5 [PH] (ref 5–9)
PLATELET # BLD AUTO: 311 10*3/MM3 (ref 140–450)
PMV BLD AUTO: 10 FL (ref 6–12)
POTASSIUM SERPL-SCNC: 4.1 MMOL/L (ref 3.5–5.2)
PROT SERPL-MCNC: 7 G/DL (ref 6–8.5)
PROT UR QL STRIP: ABNORMAL
RBC # BLD AUTO: 4.91 10*6/MM3 (ref 4.14–5.8)
RBC # UR: ABNORMAL /HPF
REF LAB TEST METHOD: ABNORMAL
SODIUM SERPL-SCNC: 140 MMOL/L (ref 136–145)
SP GR UR STRIP: 1.02 (ref 1–1.03)
SQUAMOUS #/AREA URNS HPF: ABNORMAL /HPF
UROBILINOGEN UR QL STRIP: ABNORMAL
WBC # BLD AUTO: 7.19 10*3/MM3 (ref 3.4–10.8)
WBC UR QL AUTO: ABNORMAL /HPF
WHOLE BLOOD HOLD SPECIMEN: NORMAL

## 2021-09-26 PROCEDURE — 25010000002 ONDANSETRON PER 1 MG: Performed by: EMERGENCY MEDICINE

## 2021-09-26 PROCEDURE — 83690 ASSAY OF LIPASE: CPT | Performed by: EMERGENCY MEDICINE

## 2021-09-26 PROCEDURE — 96374 THER/PROPH/DIAG INJ IV PUSH: CPT

## 2021-09-26 PROCEDURE — 85025 COMPLETE CBC W/AUTO DIFF WBC: CPT | Performed by: EMERGENCY MEDICINE

## 2021-09-26 PROCEDURE — 25010000002 KETOROLAC TROMETHAMINE PER 15 MG: Performed by: EMERGENCY MEDICINE

## 2021-09-26 PROCEDURE — 81001 URINALYSIS AUTO W/SCOPE: CPT | Performed by: EMERGENCY MEDICINE

## 2021-09-26 PROCEDURE — 99283 EMERGENCY DEPT VISIT LOW MDM: CPT

## 2021-09-26 PROCEDURE — 25010000002 HYDROMORPHONE 1 MG/ML SOLUTION: Performed by: EMERGENCY MEDICINE

## 2021-09-26 PROCEDURE — 80053 COMPREHEN METABOLIC PANEL: CPT | Performed by: EMERGENCY MEDICINE

## 2021-09-26 PROCEDURE — 74176 CT ABD & PELVIS W/O CONTRAST: CPT

## 2021-09-26 PROCEDURE — 96376 TX/PRO/DX INJ SAME DRUG ADON: CPT

## 2021-09-26 PROCEDURE — 96375 TX/PRO/DX INJ NEW DRUG ADDON: CPT

## 2021-09-26 PROCEDURE — 25010000002 HYDROMORPHONE 1 MG/ML SOLUTION

## 2021-09-26 RX ORDER — ONDANSETRON 2 MG/ML
4 INJECTION INTRAMUSCULAR; INTRAVENOUS ONCE
Status: COMPLETED | OUTPATIENT
Start: 2021-09-26 | End: 2021-09-26

## 2021-09-26 RX ORDER — TAMSULOSIN HYDROCHLORIDE 0.4 MG/1
0.4 CAPSULE ORAL ONCE
Status: COMPLETED | OUTPATIENT
Start: 2021-09-26 | End: 2021-09-26

## 2021-09-26 RX ORDER — KETOROLAC TROMETHAMINE 10 MG/1
10 TABLET, FILM COATED ORAL EVERY 6 HOURS PRN
Qty: 15 TABLET | Refills: 0 | Status: SHIPPED | OUTPATIENT
Start: 2021-09-26 | End: 2021-12-06

## 2021-09-26 RX ORDER — TAMSULOSIN HYDROCHLORIDE 0.4 MG/1
1 CAPSULE ORAL DAILY
Qty: 15 CAPSULE | Refills: 0 | Status: SHIPPED | OUTPATIENT
Start: 2021-09-26 | End: 2021-12-06

## 2021-09-26 RX ORDER — KETOROLAC TROMETHAMINE 30 MG/ML
30 INJECTION, SOLUTION INTRAMUSCULAR; INTRAVENOUS ONCE
Status: COMPLETED | OUTPATIENT
Start: 2021-09-26 | End: 2021-09-26

## 2021-09-26 RX ORDER — SODIUM CHLORIDE 9 MG/ML
125 INJECTION, SOLUTION INTRAVENOUS CONTINUOUS
Status: DISCONTINUED | OUTPATIENT
Start: 2021-09-26 | End: 2021-09-26 | Stop reason: HOSPADM

## 2021-09-26 RX ORDER — OXYCODONE HYDROCHLORIDE AND ACETAMINOPHEN 5; 325 MG/1; MG/1
1 TABLET ORAL EVERY 6 HOURS PRN
Qty: 15 TABLET | Refills: 0 | Status: SHIPPED | OUTPATIENT
Start: 2021-09-26 | End: 2021-12-06

## 2021-09-26 RX ORDER — SODIUM CHLORIDE 0.9 % (FLUSH) 0.9 %
10 SYRINGE (ML) INJECTION AS NEEDED
Status: DISCONTINUED | OUTPATIENT
Start: 2021-09-26 | End: 2021-09-26 | Stop reason: HOSPADM

## 2021-09-26 RX ADMIN — ONDANSETRON 4 MG: 2 INJECTION INTRAMUSCULAR; INTRAVENOUS at 07:26

## 2021-09-26 RX ADMIN — HYDROMORPHONE HYDROCHLORIDE 1 MG: 1 INJECTION, SOLUTION INTRAMUSCULAR; INTRAVENOUS; SUBCUTANEOUS at 07:26

## 2021-09-26 RX ADMIN — TAMSULOSIN HYDROCHLORIDE 0.4 MG: 0.4 CAPSULE ORAL at 07:59

## 2021-09-26 RX ADMIN — SODIUM CHLORIDE 125 ML/HR: 900 INJECTION, SOLUTION INTRAVENOUS at 07:44

## 2021-09-26 RX ADMIN — KETOROLAC TROMETHAMINE 30 MG: 30 INJECTION, SOLUTION INTRAMUSCULAR; INTRAVENOUS at 07:26

## 2021-09-26 RX ADMIN — Medication 0.5 MG: at 07:57

## 2021-09-26 RX ADMIN — HYDROMORPHONE HYDROCHLORIDE 0.5 MG: 1 INJECTION, SOLUTION INTRAMUSCULAR; INTRAVENOUS; SUBCUTANEOUS at 07:57

## 2021-09-26 NOTE — DISCHARGE INSTRUCTIONS
Followup with the urology service in 2-3 days should symptoms persist.  Return with any new or worsening symptoms, or any concerns.

## 2021-09-26 NOTE — ED NOTES
Patient woke up this morning with right sided abd pain, flank pain, going down into his right groin, periods of nausea.          Amy Russo RN  09/26/21 0714

## 2021-09-26 NOTE — ED PROVIDER NOTES
Subjective   Patient presents to the emergency department complaint of right flank and back pain.  No prior history of the same.  No prior history of kidney stones.  Patient does have history of irritable bowel syndrome.  Patient notes multiple episodes of belly pain with that but nothing of the nature and intensity of the pain has had since 5 AM.  This is waxing and waning and is difficult to find a comfortable position and radiating down to his right groin.  Patient does have periods of nausea with this.  Has been no vomiting.  No bowel changes.          Review of Systems   Constitutional: Positive for activity change. Negative for appetite change, chills, fatigue and fever.   HENT: Negative.  Negative for congestion.    Eyes: Negative.  Negative for photophobia and visual disturbance.   Respiratory: Negative.  Negative for cough, chest tightness and shortness of breath.    Cardiovascular: Negative.  Negative for chest pain and palpitations.   Gastrointestinal: Positive for nausea. Negative for abdominal pain, constipation, diarrhea and vomiting.   Endocrine: Negative.    Genitourinary: Positive for flank pain. Negative for decreased urine volume, dysuria and hematuria.   Musculoskeletal: Positive for back pain. Negative for arthralgias, myalgias, neck pain and neck stiffness.   Skin: Negative.  Negative for pallor.   Neurological: Negative.  Negative for dizziness, syncope, weakness, light-headedness, numbness and headaches.   Psychiatric/Behavioral: Negative.  Negative for confusion and suicidal ideas. The patient is not nervous/anxious.    All other systems reviewed and are negative.      Past Medical History:   Diagnosis Date   • CTS (carpal tunnel syndrome)    • Dysphagia    • GERD (gastroesophageal reflux disease)    • Hyperlipidemia    • IBS (irritable bowel syndrome)    • Other obesity    • Tobacco dependence syndrome        Allergies   Allergen Reactions   • Shellfish-Derived Products Swelling       Past  Surgical History:   Procedure Laterality Date   • CARPAL TUNNEL RELEASE     • COLONOSCOPY     • COLONOSCOPY N/A 10/3/2018    Procedure: COLONOSCOPY;  Surgeon: Vijay Rodriguez MD;  Location: [facility] ENDOSCOPY;  Service: Gastroenterology   • ECHO - CONVERTED  06/10/2016    Ef 55-60%.Impiared LV relaxation. [facility]   • ENDOSCOPY N/A 1/11/2018    Procedure: ESOPHAGOGASTRODUODENOSCOPY possible dilation;  Surgeon: Vijay Rodriguez MD;  Location: [facility] ENDOSCOPY;  Service:    • HAND SURGERY  11/06/2012    Excision. Osteochondroma & exostosis of right index finger of proximal phalanx   • ROTATOR CUFF REPAIR     • TONSILLECTOMY     • TONSILLECTOMY     • UPPER GASTROINTESTINAL ENDOSCOPY  01/11/2018       Family History   Problem Relation Age of Onset   • Diabetes Mother    • Heart disease Mother         ischemic   • Hypertension Mother    • Heart failure Mother         congestive   • Hypertension Father    • Diabetes Father    • Stroke Father    • Heart disease Father    • Cancer Father 73        tenal cell carcinoma.   • Hypertension Sister    • Diabetes Sister    • Thyroid cancer Sister    • Cancer Sister 46        bladder cancer   • Heart disease Brother    • Hypertension Brother    • Leukemia Other 46        sister   • Cancer Paternal Grandmother        Social History     Socioeconomic History   • Marital status:      Spouse name: Not on file   • Number of children: Not on file   • Years of education: Not on file   • Highest education level: Not on file   Tobacco Use   • Smoking status: Former Smoker     Packs/day: 1.00     Types: Cigarettes   • Smokeless tobacco: Former User     Types: Chew   Substance and Sexual Activity   • Alcohol use: Yes     Comment: occasional    • Drug use: No   • Sexual activity: Defer           Objective   Physical Exam  Vitals and nursing note reviewed.   Constitutional:       General: He is in acute distress.      Appearance: He is well-developed. He is ill-appearing. He  is not toxic-appearing.   HENT:      Head: Normocephalic and atraumatic.   Eyes:      Conjunctiva/sclera: Conjunctivae normal.      Pupils: Pupils are equal, round, and reactive to light.   Neck:      Vascular: No JVD.   Cardiovascular:      Rate and Rhythm: Normal rate and regular rhythm.      Heart sounds: Normal heart sounds. No murmur heard.   No friction rub. No gallop.    Pulmonary:      Effort: Pulmonary effort is normal. No respiratory distress.      Breath sounds: No wheezing or rales.   Chest:      Chest wall: No tenderness.   Abdominal:      General: Bowel sounds are normal. There is no distension.      Palpations: Abdomen is soft. There is no mass.      Tenderness: There is no abdominal tenderness. There is right CVA tenderness. There is no left CVA tenderness, guarding or rebound.   Musculoskeletal:         General: Normal range of motion.      Cervical back: Normal range of motion and neck supple.   Skin:     General: Skin is warm and dry.      Capillary Refill: Capillary refill takes less than 2 seconds.   Neurological:      General: No focal deficit present.      Mental Status: He is alert and oriented to person, place, and time.   Psychiatric:         Mood and Affect: Mood normal.         Behavior: Behavior normal.         Thought Content: Thought content normal.         Judgment: Judgment normal.         Procedures           ED Course                                 Labs Reviewed   COMPREHENSIVE METABOLIC PANEL - Abnormal; Notable for the following components:       Result Value    Glucose 146 (*)     All other components within normal limits    Narrative:     GFR Normal >60  Chronic Kidney Disease <60  Kidney Failure <15     URINALYSIS W/ MICROSCOPIC IF INDICATED (NO CULTURE) - Abnormal; Notable for the following components:    Protein, UA 30 mg/dL (1+) (*)     All other components within normal limits   URINALYSIS, MICROSCOPIC ONLY - Abnormal; Notable for the following components:    RBC, UA 0-2  (*)     All other components within normal limits   LIPASE - Normal   CBC WITH AUTO DIFFERENTIAL - Normal   RAINBOW DRAW    Narrative:     The following orders were created for panel order [place] Draw.  Procedure                               Abnormality         Status                     ---------                               -----------         ------                     Green Top (Gel)[[id number]]                                  In process                 Lavender Top[[id number]]                                     In process                 Gold Top - SST[[id number]]                                   In process                 Light Blue Top[[id number]]                                                                Please view results for these tests on the individual orders.   CBC AND DIFFERENTIAL    Narrative:     The following orders were created for panel order CBC & Differential.  Procedure                               Abnormality         Status                     ---------                               -----------         ------                     CBC Auto Differential[[id number]]        Normal              Final result                 Please view results for these tests on the individual orders.   GREEN TOP   LAVENDER TOP   GOLD TOP - SST   LIGHT BLUE TOP       CT Abdomen Pelvis Without Contrast   Final Result   2 mm right UVJ stone with mild right hydroureter and   hydronephrosis.      Hepatosteatosis.      Additional chronic findings as above.      Electronically signed by:  Armaan Bocanegra MD  9/26/2021   7:55 AM CDT Workstation: [id number]                  [facility]    Final diagnoses:   Kidney stone       ED Disposition  ED Disposition     ED Disposition Condition Comment    Discharge Stable           [place], Babak YANG MD  [address] [place], [place] [address]  [phone]               Medication List      New Prescriptions    ketorolac 10 MG tablet  Commonly known as: TORADOL  Take 1  tablet by mouth Every 6 (Six) Hours As Needed for Moderate Pain .     oxyCODONE-acetaminophen 5-325 MG per tablet  Commonly known as: PERCOCET  Take 1 tablet by mouth Every 6 (Six) Hours As Needed for Severe Pain .     tamsulosin 0.4 MG capsule 24 hr capsule  Commonly known as: FLOMAX  Take 1 capsule by mouth Daily.           Where to Get Your Medications      These medications were sent to [facility]/pharmacy #[id number] - [place], [place] - [address] [place] [address] [phone]  - [phone] [address] [address]    Phone: [phone]   · ketorolac 10 MG tablet  · oxyCODONE-acetaminophen 5-325 MG per tablet  · tamsulosin 0.4 MG capsule 24 hr capsule          Nima Beltran MD  09/26/21 0759       Nima Beltran MD  09/26/21 9244

## 2021-12-06 ENCOUNTER — OFFICE VISIT (OUTPATIENT)
Dept: FAMILY MEDICINE CLINIC | Facility: CLINIC | Age: 51
End: 2021-12-06

## 2021-12-06 VITALS
DIASTOLIC BLOOD PRESSURE: 80 MMHG | HEIGHT: 72 IN | OXYGEN SATURATION: 98 % | SYSTOLIC BLOOD PRESSURE: 144 MMHG | WEIGHT: 293.19 LBS | HEART RATE: 90 BPM | BODY MASS INDEX: 39.71 KG/M2

## 2021-12-06 DIAGNOSIS — K21.00 GASTROESOPHAGEAL REFLUX DISEASE WITH ESOPHAGITIS WITHOUT HEMORRHAGE: Primary | ICD-10-CM

## 2021-12-06 DIAGNOSIS — F43.10 PTSD (POST-TRAUMATIC STRESS DISORDER): ICD-10-CM

## 2021-12-06 DIAGNOSIS — M19.90 OSTEOARTHRITIS, UNSPECIFIED OSTEOARTHRITIS TYPE, UNSPECIFIED SITE: ICD-10-CM

## 2021-12-06 DIAGNOSIS — K58.2 IRRITABLE BOWEL SYNDROME WITH BOTH CONSTIPATION AND DIARRHEA: ICD-10-CM

## 2021-12-06 PROCEDURE — 99214 OFFICE O/P EST MOD 30 MIN: CPT | Performed by: FAMILY MEDICINE

## 2021-12-06 RX ORDER — DICYCLOMINE HYDROCHLORIDE 10 MG/1
10 CAPSULE ORAL
Qty: 90 CAPSULE | Refills: 2 | Status: SHIPPED | OUTPATIENT
Start: 2021-12-06 | End: 2022-02-21

## 2021-12-06 RX ORDER — PANTOPRAZOLE SODIUM 40 MG/1
40 TABLET, DELAYED RELEASE ORAL DAILY
Qty: 30 TABLET | Refills: 5 | Status: SHIPPED | OUTPATIENT
Start: 2021-12-06 | End: 2022-03-31

## 2021-12-06 RX ORDER — HYOSCYAMINE SULFATE EXTENDED-RELEASE 0.38 MG/1
0.38 TABLET ORAL EVERY 12 HOURS PRN
Qty: 60 TABLET | Refills: 12 | Status: SHIPPED | OUTPATIENT
Start: 2021-12-06

## 2021-12-06 RX ORDER — IBUPROFEN 600 MG/1
600 TABLET ORAL 3 TIMES DAILY
Qty: 90 TABLET | Refills: 2 | Status: SHIPPED | OUTPATIENT
Start: 2021-12-06

## 2021-12-06 NOTE — PROGRESS NOTES
Subjective   Venkatesh Grimes is a 51 y.o. male.   Cc: follow up of chronic medical issues    Irritable Bowel Syndrome  This is a chronic problem. The current episode started more than 1 year ago. The problem occurs daily. Associated symptoms include abdominal pain, arthralgias, fatigue, headaches and myalgias. Pertinent negatives include no anorexia, change in bowel habit, chest pain, chills, congestion, coughing, diaphoresis, fever, joint swelling, nausea, neck pain, numbness, rash, sore throat, swollen glands, urinary symptoms, vertigo, visual change, vomiting or weakness.   Heartburn  He complains of abdominal pain, early satiety, heartburn and a hoarse voice. He reports no belching, no chest pain, no choking, no coughing, no dysphagia, no nausea, no sore throat, no water brash or no wheezing. Associated symptoms include fatigue. Pertinent negatives include no weight loss.   Osteoarthritis  Presents for follow-up visit. He complains of pain and stiffness. He reports no joint swelling or joint warmth. Affected locations include the right knee, left knee, left elbow and right elbow. His pain is at a severity of 6/10. Associated symptoms include fatigue and pain at night. Pertinent negatives include no diarrhea, dry eyes, dry mouth, dysuria, fever, pain while resting, rash, Raynaud's syndrome, uveitis or weight loss. His past medical history is significant for osteoarthritis.   He has had issues with Anxiety and PTSD.    The following portions of the patient's history were reviewed and updated as appropriate: allergies, current medications, past family history, past medical history, past social history, past surgical history and problem list.    Review of Systems   Constitutional: Positive for fatigue. Negative for chills, diaphoresis, fever and weight loss.   HENT: Positive for hoarse voice. Negative for congestion and sore throat.    Respiratory: Negative for cough, choking and wheezing.    Cardiovascular:  "Negative for chest pain.   Gastrointestinal: Positive for abdominal pain and heartburn. Negative for anorexia, change in bowel habit, diarrhea, dysphagia, nausea and vomiting.   Genitourinary: Negative for dysuria.   Musculoskeletal: Positive for arthralgias, myalgias and stiffness. Negative for joint swelling and neck pain.   Skin: Negative for rash.   Neurological: Positive for headaches. Negative for vertigo, weakness and numbness.       Objective   Physical Exam  Vitals reviewed.   Constitutional:       Appearance: Normal appearance.   HENT:      Head: Normocephalic and atraumatic.      Right Ear: Tympanic membrane, ear canal and external ear normal.      Left Ear: Tympanic membrane and ear canal normal.      Nose: Nose normal.      Mouth/Throat:      Mouth: Mucous membranes are moist.      Pharynx: Oropharynx is clear.   Cardiovascular:      Rate and Rhythm: Normal rate and regular rhythm.      Heart sounds: No murmur heard.  No friction rub. No gallop.    Pulmonary:      Effort: Pulmonary effort is normal. No respiratory distress.      Breath sounds: Normal breath sounds. No stridor. No wheezing, rhonchi or rales.      Comments: Some tenderness on palpation of the left lower anterior chest wall.  Chest:      Chest wall: No tenderness.   Abdominal:      General: There is no distension.      Palpations: Abdomen is soft. There is no mass.      Tenderness: There is no abdominal tenderness. There is no guarding or rebound.      Hernia: No hernia is present.   Musculoskeletal:      Cervical back: Normal range of motion.      Comments: Mild tenderness of the low back,the elbows and knees.   Skin:     General: Skin is warm and dry.   Neurological:      Mental Status: He is alert.           Visit Vitals  /80   Pulse 90   Ht 182.9 cm (72\")   Wt 133 kg (293 lb 3 oz)   SpO2 98%   BMI 39.76 kg/m²     Body mass index is 39.76 kg/m².      Assessment/Plan   Diagnoses and all orders for this visit:    1. Gastroesophageal " reflux disease with esophagitis without hemorrhage (Primary)  -     pantoprazole (Protonix) 40 MG EC tablet; Take 1 tablet by mouth Daily.  Dispense: 30 tablet; Refill: 5  -     Comprehensive metabolic panel; Future  -     CBC w AUTO Differential; Future    2. Irritable bowel syndrome with both constipation and diarrhea  -     dicyclomine (Bentyl) 10 MG capsule; Take 1 capsule by mouth 4 (Four) Times a Day Before Meals & at Bedtime.  Dispense: 90 capsule; Refill: 2  -     Comprehensive metabolic panel; Future  -     CBC w AUTO Differential; Future    3. Osteoarthritis, unspecified osteoarthritis type, unspecified site    4. PTSD (post-traumatic stress disorder)    Other orders  -     hyoscyamine (LEVBID) 0.375 MG 12 hr tablet; Take 1 tablet by mouth Every 12 (Twelve) Hours As Needed for Cramping.  Dispense: 60 tablet; Refill: 12  -     ibuprofen (ADVIL,MOTRIN) 600 MG tablet; Take 1 tablet by mouth 3 (Three) Times a Day.  Dispense: 90 tablet; Refill: 2  -     busPIRone (BUSPAR) 5 MG tablet; Take 1 tablet by mouth 3 (Three) Times a Day.  Dispense: 90 tablet; Refill: 2    I reviewed the CBC,CMP,and magnesium with the patient.  Return to the clinic in 3 month/s.  Will contact with results as needed.  .

## 2021-12-07 ENCOUNTER — TELEPHONE (OUTPATIENT)
Dept: FAMILY MEDICINE CLINIC | Facility: CLINIC | Age: 51
End: 2021-12-07

## 2021-12-07 RX ORDER — BUSPIRONE HYDROCHLORIDE 5 MG/1
5 TABLET ORAL 3 TIMES DAILY
Qty: 90 TABLET | Refills: 2 | Status: SHIPPED | OUTPATIENT
Start: 2021-12-07 | End: 2022-01-03 | Stop reason: SDUPTHER

## 2021-12-07 NOTE — TELEPHONE ENCOUNTER
Mr. Grimes saw Dr Mcclain on 12-6-21 they are wanting to know if any of the medications Dr Mcclain prescribed are for anxiety. Please call back @ [phone]

## 2022-01-03 RX ORDER — BUSPIRONE HYDROCHLORIDE 5 MG/1
5 TABLET ORAL 3 TIMES DAILY
Qty: 270 TABLET | Refills: 0 | Status: SHIPPED | OUTPATIENT
Start: 2022-01-03 | End: 2022-03-29

## 2022-02-19 DIAGNOSIS — K58.2 IRRITABLE BOWEL SYNDROME WITH BOTH CONSTIPATION AND DIARRHEA: ICD-10-CM

## 2022-02-21 RX ORDER — DICYCLOMINE HYDROCHLORIDE 10 MG/1
10 CAPSULE ORAL
Qty: 90 CAPSULE | Refills: 2 | Status: SHIPPED | OUTPATIENT
Start: 2022-02-21

## 2022-03-29 RX ORDER — BUSPIRONE HYDROCHLORIDE 5 MG/1
5 TABLET ORAL 3 TIMES DAILY
Qty: 45 TABLET | Refills: 0 | Status: SHIPPED | OUTPATIENT
Start: 2022-03-29

## 2022-03-31 DIAGNOSIS — K21.00 GASTROESOPHAGEAL REFLUX DISEASE WITH ESOPHAGITIS WITHOUT HEMORRHAGE: ICD-10-CM

## 2022-03-31 RX ORDER — PANTOPRAZOLE SODIUM 40 MG/1
TABLET, DELAYED RELEASE ORAL
Qty: 90 TABLET | Refills: 1 | Status: SHIPPED | OUTPATIENT
Start: 2022-03-31

## (undated) DEVICE — SINGLE-USE BIOPSY FORCEPS: Brand: RADIAL JAW 4

## (undated) DEVICE — CANN SMPL SOFTECH BIFLO ETCO2 A/M 7FT